# Patient Record
Sex: FEMALE | Race: WHITE | NOT HISPANIC OR LATINO | Employment: OTHER | URBAN - METROPOLITAN AREA
[De-identification: names, ages, dates, MRNs, and addresses within clinical notes are randomized per-mention and may not be internally consistent; named-entity substitution may affect disease eponyms.]

---

## 2023-02-23 LAB
ALANINE AMINOTRANSFERASE (SGPT) (U/L) IN SER/PLAS: 15 U/L (ref 7–45)
ALBUMIN (G/DL) IN SER/PLAS: 4.1 G/DL (ref 3.4–5)
ALKALINE PHOSPHATASE (U/L) IN SER/PLAS: 47 U/L (ref 33–136)
ANION GAP IN SER/PLAS: 12 MMOL/L (ref 10–20)
ASPARTATE AMINOTRANSFERASE (SGOT) (U/L) IN SER/PLAS: 19 U/L (ref 9–39)
BILIRUBIN TOTAL (MG/DL) IN SER/PLAS: 0.8 MG/DL (ref 0–1.2)
CALCIUM (MG/DL) IN SER/PLAS: 9.7 MG/DL (ref 8.6–10.3)
CARBON DIOXIDE, TOTAL (MMOL/L) IN SER/PLAS: 31 MMOL/L (ref 21–32)
CHLORIDE (MMOL/L) IN SER/PLAS: 100 MMOL/L (ref 98–107)
CHOLESTEROL IN LDL (MG/DL) IN SER/PLAS BY DIRECT ASSAY: 87 MG/DL (ref 0–129)
CREATININE (MG/DL) IN SER/PLAS: 1.28 MG/DL (ref 0.5–1.05)
ESTIMATED AVERAGE GLUCOSE FOR HBA1C: 143 MG/DL
GFR FEMALE: 40 ML/MIN/1.73M2
GLUCOSE (MG/DL) IN SER/PLAS: 121 MG/DL (ref 74–99)
HEMOGLOBIN A1C/HEMOGLOBIN TOTAL IN BLOOD: 6.6 %
POTASSIUM (MMOL/L) IN SER/PLAS: 4 MMOL/L (ref 3.5–5.3)
PROTEIN TOTAL: 7.1 G/DL (ref 6.4–8.2)
SODIUM (MMOL/L) IN SER/PLAS: 139 MMOL/L (ref 136–145)
THYROTROPIN (MIU/L) IN SER/PLAS BY DETECTION LIMIT <= 0.05 MIU/L: 1.92 MIU/L (ref 0.44–3.98)
UREA NITROGEN (MG/DL) IN SER/PLAS: 37 MG/DL (ref 6–23)

## 2023-05-09 ENCOUNTER — PATIENT OUTREACH (OUTPATIENT)
Dept: CARE COORDINATION | Facility: CLINIC | Age: 88
End: 2023-05-09

## 2023-05-09 DIAGNOSIS — S72.144A CLOSED NONDISPLACED INTERTROCHANTERIC FRACTURE OF RIGHT FEMUR, INITIAL ENCOUNTER (MULTI): ICD-10-CM

## 2023-05-09 RX ORDER — ASPIRIN 325 MG
325 TABLET ORAL 2 TIMES DAILY
COMMUNITY
End: 2023-05-22 | Stop reason: WASHOUT

## 2023-05-09 NOTE — PROGRESS NOTES
Discharge Facility:[facility]  Discharge Diagnosis:  Admission Reason: acute right hip  intertrochanteric fracture.   Final Discharge Diagnoses: acute right hip  intertrochanteric fracture. s/p surgery   Procedures: Date: 06-May-2023 07:43:00  Procedure Name: 1. IM nail right hip          Admission Date:5.5.23  Discharge Date: 5.8.23    PCP Appointment Date:5.11.23-BMP, CBC requested per hospital dc instructions. Unknown if C nurse will draw  Specialist Appointment Date: unknown  Hospital Encounter and Summary: Linked   See discharge assessment below for further details     Engagement  Call Start Time: 1047 (5/9/2023 10:47 AM)    Medications  Medications reviewed with patient/caregiver?: Yes (reviewed new meds.per patient Md told her she could take ASA and not Lovenox injections) (5/9/2023 10:47 AM)  Is the patient having any side effects they believe may be caused by any medication additions or changes?: No (5/9/2023 10:47 AM)  Does the patient have all medications ordered at discharge?: Yes (5/9/2023 10:47 AM)  Care Management Interventions: No intervention needed (5/9/2023 10:47 AM)  Is the patient taking all medications as directed (includes completed medication regime)?: Yes (5/9/2023 10:47 AM)    Appointments  Does the patient have a primary care provider?: Yes (5/9/2023 10:47 AM)  Care Management Interventions: Verified appointment date/time/provider (5/9/2023 10:47 AM)  Has the patient kept scheduled appointments due by today?: Yes (5/9/2023 10:47 AM)  Care Management Interventions: Advised patient to keep appointment (5/9/2023 10:47 AM)    Self Management  What is the home health agency?: yes, - RN, PT (5/9/2023 10:47 AM)  Has home health visited the patient within 72 hours of discharge?: Yes (5/9/2023 10:47 AM)  DME Interventions: -- (PT to evsl) (5/9/2023 10:47 AM)

## 2023-05-11 LAB
ANION GAP IN SER/PLAS: 13 MMOL/L (ref 10–20)
CALCIUM (MG/DL) IN SER/PLAS: 8.9 MG/DL (ref 8.6–10.3)
CARBON DIOXIDE, TOTAL (MMOL/L) IN SER/PLAS: 29 MMOL/L (ref 21–32)
CHLORIDE (MMOL/L) IN SER/PLAS: 100 MMOL/L (ref 98–107)
CREATININE (MG/DL) IN SER/PLAS: 0.95 MG/DL (ref 0.5–1.05)
ERYTHROCYTE DISTRIBUTION WIDTH (RATIO) BY AUTOMATED COUNT: 13.4 % (ref 11.5–14.5)
ERYTHROCYTE MEAN CORPUSCULAR HEMOGLOBIN CONCENTRATION (G/DL) BY AUTOMATED: 31.2 G/DL (ref 32–36)
ERYTHROCYTE MEAN CORPUSCULAR VOLUME (FL) BY AUTOMATED COUNT: 98 FL (ref 80–100)
ERYTHROCYTES (10*6/UL) IN BLOOD BY AUTOMATED COUNT: 2.83 X10E12/L (ref 4–5.2)
GFR FEMALE: 57 ML/MIN/1.73M2
GLUCOSE (MG/DL) IN SER/PLAS: 101 MG/DL (ref 74–99)
HEMATOCRIT (%) IN BLOOD BY AUTOMATED COUNT: 27.6 % (ref 36–46)
HEMOGLOBIN (G/DL) IN BLOOD: 8.6 G/DL (ref 12–16)
LEUKOCYTES (10*3/UL) IN BLOOD BY AUTOMATED COUNT: 12.4 X10E9/L (ref 4.4–11.3)
PLATELETS (10*3/UL) IN BLOOD AUTOMATED COUNT: 226 X10E9/L (ref 150–450)
POTASSIUM (MMOL/L) IN SER/PLAS: 3.7 MMOL/L (ref 3.5–5.3)
SODIUM (MMOL/L) IN SER/PLAS: 138 MMOL/L (ref 136–145)
UREA NITROGEN (MG/DL) IN SER/PLAS: 31 MG/DL (ref 6–23)

## 2023-05-18 PROBLEM — K22.70 BARRETT'S ESOPHAGUS: Status: ACTIVE | Noted: 2023-05-18

## 2023-05-18 PROBLEM — K21.9 GERD (GASTROESOPHAGEAL REFLUX DISEASE): Status: ACTIVE | Noted: 2023-05-18

## 2023-05-18 PROBLEM — M81.0 OSTEOPOROSIS: Status: ACTIVE | Noted: 2023-05-18

## 2023-05-18 PROBLEM — K57.90 DIVERTICULOSIS: Status: ACTIVE | Noted: 2023-05-18

## 2023-05-18 PROBLEM — N28.9 RENAL INSUFFICIENCY: Status: ACTIVE | Noted: 2023-05-18

## 2023-05-18 PROBLEM — M19.90 DJD (DEGENERATIVE JOINT DISEASE): Status: ACTIVE | Noted: 2023-05-18

## 2023-05-18 PROBLEM — I65.29 CAROTID ARTERY STENOSIS: Status: ACTIVE | Noted: 2023-05-18

## 2023-05-18 PROBLEM — E78.5 HYPERLIPEMIA: Status: ACTIVE | Noted: 2023-05-18

## 2023-05-18 PROBLEM — N20.0 KIDNEY STONE: Status: ACTIVE | Noted: 2023-05-18

## 2023-05-18 PROBLEM — I10 BENIGN ESSENTIAL HTN: Status: ACTIVE | Noted: 2023-05-18

## 2023-05-18 PROBLEM — K27.9 PEPTIC ULCER: Status: ACTIVE | Noted: 2023-05-18

## 2023-05-18 PROBLEM — M51.26 LUMBAR DISC HERNIATION: Status: ACTIVE | Noted: 2023-05-18

## 2023-05-18 PROBLEM — M54.17 LUMBOSACRAL RADICULOPATHY: Status: ACTIVE | Noted: 2023-05-18

## 2023-05-18 PROBLEM — E11.9 CONTROLLED TYPE 2 DIABETES MELLITUS WITHOUT COMPLICATION, WITHOUT LONG-TERM CURRENT USE OF INSULIN (MULTI): Status: ACTIVE | Noted: 2023-05-18

## 2023-05-18 PROBLEM — E03.9 HYPOTHYROID: Status: ACTIVE | Noted: 2023-05-18

## 2023-05-18 RX ORDER — LISINOPRIL AND HYDROCHLOROTHIAZIDE 20; 25 MG/1; MG/1
1 TABLET ORAL DAILY
COMMUNITY
End: 2023-10-09 | Stop reason: WASHOUT

## 2023-05-18 RX ORDER — ATENOLOL 50 MG/1
1 TABLET ORAL DAILY
COMMUNITY
End: 2024-01-10 | Stop reason: SDUPTHER

## 2023-05-18 RX ORDER — LEVOTHYROXINE SODIUM 75 UG/1
1 TABLET ORAL DAILY
COMMUNITY
End: 2024-01-10 | Stop reason: SDUPTHER

## 2023-05-18 RX ORDER — AMLODIPINE BESYLATE 2.5 MG/1
1 TABLET ORAL DAILY
COMMUNITY
Start: 2022-03-02 | End: 2024-01-10 | Stop reason: SDUPTHER

## 2023-05-18 RX ORDER — PANTOPRAZOLE SODIUM 40 MG/1
1 TABLET, DELAYED RELEASE ORAL DAILY
COMMUNITY
End: 2024-01-10 | Stop reason: SDUPTHER

## 2023-05-22 ENCOUNTER — OFFICE VISIT (OUTPATIENT)
Dept: PRIMARY CARE | Facility: CLINIC | Age: 88
End: 2023-05-22
Payer: MEDICARE

## 2023-05-22 VITALS
HEIGHT: 63 IN | SYSTOLIC BLOOD PRESSURE: 130 MMHG | DIASTOLIC BLOOD PRESSURE: 60 MMHG | HEART RATE: 91 BPM | BODY MASS INDEX: 27.02 KG/M2 | WEIGHT: 152.5 LBS | OXYGEN SATURATION: 98 %

## 2023-05-22 DIAGNOSIS — I10 BENIGN ESSENTIAL HTN: Primary | ICD-10-CM

## 2023-05-22 DIAGNOSIS — S72.001D CLOSED FRACTURE OF RIGHT HIP WITH ROUTINE HEALING: ICD-10-CM

## 2023-05-22 DIAGNOSIS — M80.80XS LOCALIZED OSTEOPOROSIS WITH CURRENT PATHOLOGICAL FRACTURE, SEQUELA: ICD-10-CM

## 2023-05-22 PROCEDURE — 1160F RVW MEDS BY RX/DR IN RCRD: CPT | Performed by: FAMILY MEDICINE

## 2023-05-22 PROCEDURE — 1159F MED LIST DOCD IN RCRD: CPT | Performed by: FAMILY MEDICINE

## 2023-05-22 PROCEDURE — 3078F DIAST BP <80 MM HG: CPT | Performed by: FAMILY MEDICINE

## 2023-05-22 PROCEDURE — 99213 OFFICE O/P EST LOW 20 MIN: CPT | Performed by: FAMILY MEDICINE

## 2023-05-22 PROCEDURE — 1036F TOBACCO NON-USER: CPT | Performed by: FAMILY MEDICINE

## 2023-05-22 PROCEDURE — 3075F SYST BP GE 130 - 139MM HG: CPT | Performed by: FAMILY MEDICINE

## 2023-05-22 ASSESSMENT — ENCOUNTER SYMPTOMS
LOSS OF SENSATION IN FEET: 0
PALPITATIONS: 0
DIARRHEA: 0
ARTHRALGIAS: 0
FATIGUE: 0
DEPRESSION: 0
ABDOMINAL PAIN: 0
BACK PAIN: 0
COUGH: 0
NAUSEA: 0
SHORTNESS OF BREATH: 0
CONSTIPATION: 0
CHEST TIGHTNESS: 0
VOMITING: 0
APPETITE CHANGE: 0
ACTIVITY CHANGE: 0
OCCASIONAL FEELINGS OF UNSTEADINESS: 0

## 2023-05-22 ASSESSMENT — PATIENT HEALTH QUESTIONNAIRE - PHQ9
1. LITTLE INTEREST OR PLEASURE IN DOING THINGS: NOT AT ALL
SUM OF ALL RESPONSES TO PHQ9 QUESTIONS 1 AND 2: 0
2. FEELING DOWN, DEPRESSED OR HOPELESS: NOT AT ALL

## 2023-05-22 NOTE — PROGRESS NOTES
"Patient: Savannah Yao  : 1934  PCP: Earl Sandy MD  MRN: [id number]  Program: No linked episodes     Savannah Yao is a 88 y.o. female presenting today for follow-up after being discharged from the hospital 14 days ago. The main problem requiring admission was right hip fracture. The discharge summary and/or Transitional Care Management documentation was reviewed. Medication reconciliation was performed as indicated via the \"Bubba as Reviewed\" timestamp.     Savannah Yao was contacted by Transitional Care Management services two days after her discharge. This encounter and supporting documentation was reviewed.    The complexity of medical decision making for this patient's transitional care is moderate.    Fell and fractured right hip, had ORIF done, went home after hospital, has HHC for nurse/PT, [place] [facility] for meals  Pain not bad, using APAP as needed  Coughing and hoarse    Physical Exam  Vitals and nursing note reviewed.   Constitutional:       Appearance: Normal appearance.   Cardiovascular:      Rate and Rhythm: Normal rate and regular rhythm.      Pulses: Normal pulses.      Heart sounds: Normal heart sounds.   Pulmonary:      Effort: Pulmonary effort is normal.      Breath sounds: Normal breath sounds.   Musculoskeletal:      Comments: 1-2+ pitting edema around the right ankle to the mid calf.  Normal internal/external rotation of the leg, minimal bruising at the groin.   Neurological:      Mental Status: She is alert.   Psychiatric:         Mood and Affect: Mood normal.         Behavior: Behavior normal.         Assessment/Plan       Review of Systems   Constitutional:  Negative for activity change, appetite change and fatigue.   Respiratory:  Negative for cough, chest tightness and shortness of breath.    Cardiovascular:  Negative for chest pain, palpitations and leg swelling.   Gastrointestinal:  Negative for abdominal pain, constipation, diarrhea, nausea and vomiting. "   Musculoskeletal:  Positive for gait problem. Negative for arthralgias and back pain.       Family History   Problem Relation Name Age of Onset    Cancer Mother      No Known Problems Father      Other (CVA) Sister      Hypertension Sister      Breast cancer Sister      Hypertension Brother         Engagement  Call Start Time: 1047 (5/9/2023 10:47 AM)    Medications  Medications reviewed with patient/caregiver?: Yes (reviewed new meds.per patient Md told her she could take ASA and not Lovenox injections) (5/9/2023 10:47 AM)  Is the patient having any side effects they believe may be caused by any medication additions or changes?: No (5/9/2023 10:47 AM)  Does the patient have all medications ordered at discharge?: Yes (5/9/2023 10:47 AM)  Care Management Interventions: No intervention needed (5/9/2023 10:47 AM)  Is the patient taking all medications as directed (includes completed medication regime)?: Yes (5/9/2023 10:47 AM)    Appointments  Does the patient have a primary care provider?: Yes (5/9/2023 10:47 AM)  Care Management Interventions: Verified appointment date/time/provider (5/9/2023 10:47 AM)  Has the patient kept scheduled appointments due by today?: Yes (5/9/2023 10:47 AM)  Care Management Interventions: Advised patient to keep appointment (5/9/2023 10:47 AM)    Self Management  What is the home health agency?: yes, - RN, PT (5/9/2023 10:47 AM)  Has home health visited the patient within 72 hours of discharge?: Yes (5/9/2023 10:47 AM)  DME Interventions: -- (PT to evsl) (5/9/2023 10:47 AM)        No follow-ups on file.Patient was identified as a fall risk. Risk prevention instructions provided.

## 2023-05-22 NOTE — PROGRESS NOTES
"Subjective   Patient ID: Savannah Yao is a 88 y.o. female who presents for [facility] check RT HIP Fx.    HPI     Review of Systems    Objective   /60   Pulse 91   Ht 1.6 m (5' 3\")   Wt 69.2 kg (152 lb 8 oz)   SpO2 98%   BMI 27.01 kg/m²     Physical Exam    Assessment/Plan          "

## 2023-05-22 NOTE — ASSESSMENT & PLAN NOTE
Post internal fixation of a right hip fracture in the hospital, now 14 days out from discharge, tolerating home physical therapy, has home services with nurse and [employer], did see orthopedics last week, seems to be healing well from her fall.

## 2023-05-22 NOTE — ASSESSMENT & PLAN NOTE
Patient with known existing osteoporosis, taking calcium and vitamin D, I have counseled the patient on injectable Prolia or Reclast in the past, patient has deferred treatment for her osteoporosis.

## 2023-05-22 NOTE — PATIENT INSTRUCTIONS
You can use cough drops, Mucinex/Robitussin as needed for coughing, if shortness of breath or fevers, call      Ways to Help Prevent Falls at Home    Quick Tips   ? Ask for help if you need it. Most people want to help!   ? Get up slowly after sitting or laying down   ? Wear a medical alert device or keep cell phone in your pocket   ? Use night lights, especially areas near a bathroom   ? Keep the items you use often within reach on a small stool or end table   ? Use an assistive device such as walker or cane, as directed by provider/physical therapy   ? Use a non-slip mat and grab bars in your bathroom. Look for home health sections for best options     Other Areas to Focus On   ? Exercise and nutrition: Regular exercise or taking a falls prevention class are great ways improve strength and balance. Don’t forget to stay hydrated and bring a snack!   ? Medicine side effects: Some medicines can make you sleepy or dizzy, which could cause a fall. Ask your healthcare provider about the side effects your medicines could cause. Be sure to let them know if you take any vitamins or supplements as well.   ? Tripping hazards: Remove items you could trip on, such as loose mats, rugs, cords, and clutter. Wear closed toe shoes with rubber soles.   ? Health and wellness: Get regular checkups with your healthcare provider, plus routine vision and hearing screenings. Talk with your healthcare provider about:   o Your medicines and the possible side effects - bring them in a bag if that is easier!   o Problems with balance or feeling dizzy   o Ways to promote bone health, such as Vitamin D and calcium supplements   o Questions or concerns about falling     *Ask your healthcare team if you have questions     [facility], 2022

## 2023-06-06 ENCOUNTER — PATIENT OUTREACH (OUTPATIENT)
Dept: CARE COORDINATION | Facility: CLINIC | Age: 88
End: 2023-06-06

## 2023-06-06 NOTE — PROGRESS NOTES
Call placed regarding one month post discharge follow up call.             At time of outreach call the patient feels as if their condition has            Improved since initial visit with PCP or specialist.             Questions or concerns regarding recovery period addressed at this time.               (Individualize as needed if questions arise)             Reviewed any PCP or specialists progress notes/labs/radiology reports if applicable             and addressed any questions or concerns.

## 2023-06-07 ENCOUNTER — TELEPHONE (OUTPATIENT)
Dept: PRIMARY CARE | Facility: CLINIC | Age: 88
End: 2023-06-07

## 2023-06-07 NOTE — TELEPHONE ENCOUNTER
Pt states she is currently taking 2 low dose aspirins daily, on the 6/12 she is to stop one of them. Would you like her to resume her BP med that you stopped once she is back to one aspirin daily?

## 2023-07-05 ENCOUNTER — TELEPHONE (OUTPATIENT)
Dept: PRIMARY CARE | Facility: CLINIC | Age: 88
End: 2023-07-05

## 2023-07-05 NOTE — TELEPHONE ENCOUNTER
Pt states she loses voice every year and takes OTC Meds usually but Pt states OTC MEDS are not working and is asking for something. Please advise

## 2023-08-15 LAB
ALANINE AMINOTRANSFERASE (SGPT) (U/L) IN SER/PLAS: 11 U/L (ref 7–45)
ALBUMIN (G/DL) IN SER/PLAS: 3.8 G/DL (ref 3.4–5)
ALKALINE PHOSPHATASE (U/L) IN SER/PLAS: 51 U/L (ref 33–136)
ANION GAP IN SER/PLAS: 9 MMOL/L (ref 10–20)
ASPARTATE AMINOTRANSFERASE (SGOT) (U/L) IN SER/PLAS: 18 U/L (ref 9–39)
BILIRUBIN TOTAL (MG/DL) IN SER/PLAS: 0.8 MG/DL (ref 0–1.2)
CALCIUM (MG/DL) IN SER/PLAS: 9.5 MG/DL (ref 8.6–10.3)
CARBON DIOXIDE, TOTAL (MMOL/L) IN SER/PLAS: 30 MMOL/L (ref 21–32)
CHLORIDE (MMOL/L) IN SER/PLAS: 104 MMOL/L (ref 98–107)
CHOLESTEROL (MG/DL) IN SER/PLAS: 153 MG/DL (ref 0–199)
CHOLESTEROL IN HDL (MG/DL) IN SER/PLAS: 57 MG/DL
CHOLESTEROL/HDL RATIO: 2.7
CREATININE (MG/DL) IN SER/PLAS: 1.02 MG/DL (ref 0.5–1.05)
ESTIMATED AVERAGE GLUCOSE FOR HBA1C: 143 MG/DL
GFR FEMALE: 53 ML/MIN/1.73M2
GLUCOSE (MG/DL) IN SER/PLAS: 92 MG/DL (ref 74–99)
HEMOGLOBIN A1C/HEMOGLOBIN TOTAL IN BLOOD: 6.6 %
LDL: 73 MG/DL (ref 0–99)
POTASSIUM (MMOL/L) IN SER/PLAS: 4.2 MMOL/L (ref 3.5–5.3)
PROTEIN TOTAL: 6.5 G/DL (ref 6.4–8.2)
SODIUM (MMOL/L) IN SER/PLAS: 139 MMOL/L (ref 136–145)
TRIGLYCERIDE (MG/DL) IN SER/PLAS: 114 MG/DL (ref 0–149)
UREA NITROGEN (MG/DL) IN SER/PLAS: 22 MG/DL (ref 6–23)
VLDL: 23 MG/DL (ref 0–40)

## 2023-08-30 LAB
ALBUMIN (MG/L) IN URINE: 9.5 MG/L
ALBUMIN/CREATININE (UG/MG) IN URINE: 15.5 UG/MG CRT (ref 0–30)
CREATININE (MG/DL) IN URINE: 61.4 MG/DL (ref 20–320)

## 2023-09-05 ENCOUNTER — APPOINTMENT (OUTPATIENT)
Dept: PRIMARY CARE | Facility: CLINIC | Age: 88
End: 2023-09-05

## 2023-09-06 ASSESSMENT — ENCOUNTER SYMPTOMS
MYALGIAS: 0
FEVER: 0
FATIGUE: 0
COUGH: 0
HEADACHES: 0
CHILLS: 0
SHORTNESS OF BREATH: 0

## 2023-09-06 NOTE — PROGRESS NOTES
"Subjective   Patient ID: Savannah Yao is a 89 y.o. female who presents for Follow-up (Covid positive on 9/2 , denies having any symptoms other than cough ).    Savannah comes to the office, with her  and daughter, today as a follow-up from the emergency room visit.  Was seen at [facility] on 9/2/2023 and those records/diagnostics were reviewed today.  Was treated with albuterol/Bromfed cough syrup.  Chest x-ray showed clear lungs with no pleural effusions. No Paxlovid. Cough syrup continues to use 1/2 tsp every 4 hours. Cough nonprod. Appetite: good.  Every 6 Hours for use of DEJON. No SOB/dyspnea/wheezing.           Review of Systems   Constitutional:  Negative for chills, fatigue and fever.   Respiratory:  Negative for cough, shortness of breath and wheezing.    Gastrointestinal:  Negative for diarrhea, nausea and vomiting.   Musculoskeletal:  Negative for myalgias.   Neurological:  Negative for headaches.       Objective   /72   Pulse 79   Temp 37.4 °C (99.3 °F) (Oral)   Ht 1.6 m (5' 3\")   Wt 67.6 kg (149 lb 1.6 oz)   SpO2 93%   BMI 26.41 kg/m²     Physical Exam  Vitals and nursing note reviewed.   Constitutional:       Appearance: Normal appearance.   HENT:      Head: Normocephalic.   Cardiovascular:      Rate and Rhythm: Normal rate and regular rhythm.      Heart sounds: Normal heart sounds.   Pulmonary:      Effort: Pulmonary effort is normal.      Breath sounds: Examination of the right-lower field reveals decreased breath sounds. Examination of the left-lower field reveals decreased breath sounds. Decreased breath sounds present.   Musculoskeletal:      Cervical back: Normal range of motion.   Skin:     General: Skin is warm and dry.   Neurological:      General: No focal deficit present.      Mental Status: She is alert and oriented to person, place, and time.   Psychiatric:         Mood and Affect: Mood normal.         Thought Content: Thought content normal.       Assessment/Plan "   Problem List Items Addressed This Visit       COVID - Primary     1. COVID      Continue albuterol as needed and cough syrup               Patient was identified as a fall risk. Risk prevention instructions provided.

## 2023-09-07 ENCOUNTER — OFFICE VISIT (OUTPATIENT)
Dept: PRIMARY CARE | Facility: CLINIC | Age: 88
End: 2023-09-07
Payer: MEDICARE

## 2023-09-07 VITALS
DIASTOLIC BLOOD PRESSURE: 72 MMHG | SYSTOLIC BLOOD PRESSURE: 124 MMHG | OXYGEN SATURATION: 93 % | WEIGHT: 149.1 LBS | BODY MASS INDEX: 26.42 KG/M2 | HEIGHT: 63 IN | TEMPERATURE: 99.3 F | HEART RATE: 79 BPM

## 2023-09-07 DIAGNOSIS — U07.1 COVID: Primary | ICD-10-CM

## 2023-09-07 PROCEDURE — 99213 OFFICE O/P EST LOW 20 MIN: CPT | Performed by: NURSE PRACTITIONER

## 2023-09-07 PROCEDURE — 3078F DIAST BP <80 MM HG: CPT | Performed by: NURSE PRACTITIONER

## 2023-09-07 PROCEDURE — 3074F SYST BP LT 130 MM HG: CPT | Performed by: NURSE PRACTITIONER

## 2023-09-07 PROCEDURE — 1159F MED LIST DOCD IN RCRD: CPT | Performed by: NURSE PRACTITIONER

## 2023-09-07 PROCEDURE — 1036F TOBACCO NON-USER: CPT | Performed by: NURSE PRACTITIONER

## 2023-09-07 PROCEDURE — 1160F RVW MEDS BY RX/DR IN RCRD: CPT | Performed by: NURSE PRACTITIONER

## 2023-09-07 ASSESSMENT — ENCOUNTER SYMPTOMS
WHEEZING: 0
VOMITING: 0
DIARRHEA: 0
NAUSEA: 0

## 2023-09-07 NOTE — PATIENT INSTRUCTIONS
Continue albuterol as needed and cough syrup.  Call office if develops worsening cough/fever/chilling/no appetite.      Ways to Help Prevent Falls at Home    Quick Tips   ? Ask for help if you need it. Most people want to help!   ? Get up slowly after sitting or laying down   ? Wear a medical alert device or keep cell phone in your pocket   ? Use night lights, especially areas near a bathroom   ? Keep the items you use often within reach on a small stool or end table   ? Use an assistive device such as walker or cane, as directed by provider/physical therapy   ? Use a non-slip mat and grab bars in your bathroom. Look for home health sections for best options     Other Areas to Focus On   ? Exercise and nutrition: Regular exercise or taking a falls prevention class are great ways improve strength and balance. Don’t forget to stay hydrated and bring a snack!   ? Medicine side effects: Some medicines can make you sleepy or dizzy, which could cause a fall. Ask your healthcare provider about the side effects your medicines could cause. Be sure to let them know if you take any vitamins or supplements as well.   ? Tripping hazards: Remove items you could trip on, such as loose mats, rugs, cords, and clutter. Wear closed toe shoes with rubber soles.   ? Health and wellness: Get regular checkups with your healthcare provider, plus routine vision and hearing screenings. Talk with your healthcare provider about:   o Your medicines and the possible side effects - bring them in a bag if that is easier!   o Problems with balance or feeling dizzy   o Ways to promote bone health, such as Vitamin D and calcium supplements   o Questions or concerns about falling     *Ask your healthcare team if you have questions     [facility], 2022

## 2023-09-21 ENCOUNTER — PATIENT OUTREACH (OUTPATIENT)
Dept: CARE COORDINATION | Facility: CLINIC | Age: 88
End: 2023-09-21

## 2023-09-21 NOTE — PROGRESS NOTES
Patient has met target of no readmission for 90 days post hospital discharge and is graduated from Transitional Care Management program at this time

## 2023-10-06 ENCOUNTER — TELEPHONE (OUTPATIENT)
Dept: OTOLARYNGOLOGY | Facility: HOSPITAL | Age: 88
End: 2023-10-06

## 2023-10-09 ENCOUNTER — OFFICE VISIT (OUTPATIENT)
Dept: PRIMARY CARE | Facility: CLINIC | Age: 88
End: 2023-10-09
Payer: MEDICARE

## 2023-10-09 VITALS
HEART RATE: 73 BPM | OXYGEN SATURATION: 97 % | WEIGHT: 152.3 LBS | SYSTOLIC BLOOD PRESSURE: 130 MMHG | DIASTOLIC BLOOD PRESSURE: 70 MMHG | BODY MASS INDEX: 26.98 KG/M2 | HEIGHT: 63 IN

## 2023-10-09 DIAGNOSIS — E11.9 CONTROLLED TYPE 2 DIABETES MELLITUS WITHOUT COMPLICATION, WITHOUT LONG-TERM CURRENT USE OF INSULIN (MULTI): Primary | ICD-10-CM

## 2023-10-09 DIAGNOSIS — E03.9 ACQUIRED HYPOTHYROIDISM: ICD-10-CM

## 2023-10-09 DIAGNOSIS — M80.80XS LOCALIZED OSTEOPOROSIS WITH CURRENT PATHOLOGICAL FRACTURE, SEQUELA: ICD-10-CM

## 2023-10-09 DIAGNOSIS — I10 BENIGN ESSENTIAL HTN: ICD-10-CM

## 2023-10-09 DIAGNOSIS — E78.2 MIXED HYPERLIPIDEMIA: ICD-10-CM

## 2023-10-09 DIAGNOSIS — N28.9 RENAL INSUFFICIENCY: ICD-10-CM

## 2023-10-09 DIAGNOSIS — K21.9 GASTROESOPHAGEAL REFLUX DISEASE WITHOUT ESOPHAGITIS: ICD-10-CM

## 2023-10-09 PROBLEM — U07.1 COVID: Status: RESOLVED | Noted: 2023-09-07 | Resolved: 2023-10-09

## 2023-10-09 PROCEDURE — 1036F TOBACCO NON-USER: CPT | Performed by: FAMILY MEDICINE

## 2023-10-09 PROCEDURE — 3075F SYST BP GE 130 - 139MM HG: CPT | Performed by: FAMILY MEDICINE

## 2023-10-09 PROCEDURE — 1160F RVW MEDS BY RX/DR IN RCRD: CPT | Performed by: FAMILY MEDICINE

## 2023-10-09 PROCEDURE — 99213 OFFICE O/P EST LOW 20 MIN: CPT | Performed by: FAMILY MEDICINE

## 2023-10-09 PROCEDURE — 3078F DIAST BP <80 MM HG: CPT | Performed by: FAMILY MEDICINE

## 2023-10-09 PROCEDURE — 1159F MED LIST DOCD IN RCRD: CPT | Performed by: FAMILY MEDICINE

## 2023-10-09 ASSESSMENT — ENCOUNTER SYMPTOMS
LIGHT-HEADEDNESS: 0
UNEXPECTED WEIGHT CHANGE: 0
ADENOPATHY: 0
SHORTNESS OF BREATH: 0
VOICE CHANGE: 1
APPETITE CHANGE: 0
CONSTIPATION: 0
PALPITATIONS: 0
DIFFICULTY URINATING: 0
WHEEZING: 0
ABDOMINAL DISTENTION: 0
TROUBLE SWALLOWING: 0
HEADACHES: 0
DIARRHEA: 0
NAUSEA: 0
VOMITING: 0
ABDOMINAL PAIN: 0
FATIGUE: 0
DIZZINESS: 0
NUMBNESS: 0
RHINORRHEA: 0
COUGH: 0
ACTIVITY CHANGE: 0
ARTHRALGIAS: 0

## 2023-10-09 NOTE — ASSESSMENT & PLAN NOTE
Blood pressure under good control renal function and August it actually improved, continue with current medications recheck in 3 months.

## 2023-10-09 NOTE — ASSESSMENT & PLAN NOTE
Patient continues with calcium and vitamin D, advised about options for treating osteoporosis given her hip fracture, patient defers Reclast or Prolia despite .

## 2023-10-09 NOTE — PROGRESS NOTES
Subjective   Patient ID: Savannah Yao is a 89 y.o. female who presents for 6 MO LABS.    HPI   No headache, chest pain, shortness of breath, dizziness, lightheadedness, or edema  No low blood sugars since last OV, seen opthalmology in the past year, and no numbness or tingling in feet, skin normal.  The patient is taking thyroid medications as directed without problems, labs done in the last year, no symptoms of excessive fatigue, edema or weight gain.  Taking PPI daily without breakthrough symptoms.  Reviewed dietary, caffeine, tobacco, alcohol, and NSAID use.  No dyspepsia, dysphagia, reflux, melena, or abdominal pain.  Hip pain doing OK, to have surgery on vocal cords for hoarseness, no cough or dysphagia  Using a walker, no falls    Review of Systems   Constitutional:  Negative for activity change, appetite change, fatigue and unexpected weight change.   HENT:  Positive for voice change. Negative for ear pain, nosebleeds, rhinorrhea, sneezing and trouble swallowing.    Respiratory:  Negative for cough, shortness of breath and wheezing.    Cardiovascular:  Negative for chest pain, palpitations and leg swelling.   Gastrointestinal:  Negative for abdominal distention, abdominal pain, constipation, diarrhea, nausea and vomiting.   Genitourinary:  Negative for difficulty urinating.   Musculoskeletal:  Positive for gait problem. Negative for arthralgias.   Skin:  Negative for rash.   Neurological:  Negative for dizziness, light-headedness, numbness and headaches.   Hematological:  Negative for adenopathy.   Psychiatric/Behavioral:  Negative for behavioral problems.    All other systems reviewed and are negative.      Current Outpatient Medications:     amLODIPine (Norvasc) 2.5 mg tablet, Take 1 tablet (2.5 mg) by mouth once daily., Disp: , Rfl:     aspirin-calcium carbonate 81 mg-300 mg calcium(777 mg) tablet, Take 1 tablet by mouth once daily., Disp: , Rfl:     atenolol (Tenormin) 50 mg tablet, Take 1 tablet  "(50 mg) by mouth once daily., Disp: , Rfl:     CALCIUM CITRATE-VITAMIN D3 ORAL, Take 1 tablet by mouth once daily., Disp: , Rfl:     levothyroxine (Synthroid, Levoxyl) 75 mcg tablet, Take 1 tablet (75 mcg) by mouth once daily., Disp: , Rfl:     pantoprazole (ProtoNix) 40 mg EC tablet, Take 1 tablet (40 mg) by mouth once daily., Disp: , Rfl:       Objective   /70   Pulse 73   Ht 1.6 m (5' 3\")   Wt 69.1 kg (152 lb 4.8 oz)   SpO2 97%   BMI 26.98 kg/m²     Physical Exam  Vitals and nursing note reviewed.   Constitutional:       Appearance: Normal appearance.   HENT:      Head: Normocephalic and atraumatic.      Right Ear: Tympanic membrane, ear canal and external ear normal.      Left Ear: Tympanic membrane, ear canal and external ear normal.      Nose: Nose normal.      Mouth/Throat:      Mouth: Mucous membranes are moist.      Pharynx: Oropharynx is clear.   Cardiovascular:      Rate and Rhythm: Normal rate and regular rhythm.      Pulses: Normal pulses.      Heart sounds: Normal heart sounds.   Pulmonary:      Effort: Pulmonary effort is normal.      Breath sounds: Normal breath sounds.   Musculoskeletal:      Cervical back: Normal range of motion and neck supple.   Neurological:      Mental Status: She is alert.   Psychiatric:         Mood and Affect: Mood normal.         Behavior: Behavior normal.         Assessment/Plan   Problem List Items Addressed This Visit             ICD-10-CM    Benign essential HTN I10     Blood pressure under good control renal function and August it actually improved, continue with current medications recheck in 3 months.         Relevant Orders    Follow Up In Primary Care - Established    Comprehensive Metabolic Panel    Controlled type 2 diabetes mellitus without complication, without long-term current use of insulin (CMS/[facility]) - Primary E11.9     A1c testing in August was below 7, continue with current medication and diet no change.         Relevant Orders    Follow Up In " Primary Care - Established    Cholesterol, LDL Direct    Comprehensive Metabolic Panel    Hemoglobin A1C    GERD (gastroesophageal reflux disease) K21.9     Stable with medication, no issues with dysphagia.         Relevant Orders    Follow Up In Primary Care - Established    Hyperlipemia E78.5     Continue with current medications no change.         Relevant Orders    Follow Up In Primary Care - Established    Cholesterol, LDL Direct    Comprehensive Metabolic Panel    Hypothyroid E03.9     Last TSH was therapeutic, no change.         Relevant Orders    Follow Up In Primary Care - Established    Osteoporosis M81.0     Patient continues with calcium and vitamin D, advised about options for treating osteoporosis given her hip fracture, patient defers Reclast or Prolia despite .         Relevant Orders    Follow Up In Primary Care - Established    Renal insufficiency N28.9     Blood testing in August appear to be stable, as she creatinine improved with changing ACE inhibitor to amlodipine.         Relevant Orders    Follow Up In Primary Care - Established    Comprehensive Metabolic Panel    CBC and Auto Differential

## 2023-10-09 NOTE — ASSESSMENT & PLAN NOTE
Blood testing in August appear to be stable, as she creatinine improved with changing ACE inhibitor to amlodipine.

## 2023-10-16 DIAGNOSIS — R49.0 HOARSENESS OF VOICE: ICD-10-CM

## 2023-10-30 ENCOUNTER — PREP FOR PROCEDURE (OUTPATIENT)
Dept: OTOLARYNGOLOGY | Facility: CLINIC | Age: 88
End: 2023-10-30

## 2023-10-30 PROBLEM — R49.0 HOARSENESS OF VOICE: Status: ACTIVE | Noted: 2023-10-16

## 2023-11-16 ENCOUNTER — HOSPITAL ENCOUNTER (OUTPATIENT)
Facility: HOSPITAL | Age: 88
Setting detail: OUTPATIENT SURGERY
Discharge: HOME | End: 2023-11-16
Attending: OTOLARYNGOLOGY | Admitting: OTOLARYNGOLOGY
Payer: MEDICARE

## 2023-11-16 ENCOUNTER — ANESTHESIA (OUTPATIENT)
Dept: OPERATING ROOM | Facility: HOSPITAL | Age: 88
End: 2023-11-16
Payer: MEDICARE

## 2023-11-16 ENCOUNTER — ANESTHESIA EVENT (OUTPATIENT)
Dept: OPERATING ROOM | Facility: HOSPITAL | Age: 88
End: 2023-11-16
Payer: MEDICARE

## 2023-11-16 VITALS
TEMPERATURE: 98.1 F | WEIGHT: 151.01 LBS | OXYGEN SATURATION: 96 % | BODY MASS INDEX: 26.76 KG/M2 | SYSTOLIC BLOOD PRESSURE: 154 MMHG | DIASTOLIC BLOOD PRESSURE: 65 MMHG | RESPIRATION RATE: 14 BRPM | HEIGHT: 63 IN | HEART RATE: 78 BPM

## 2023-11-16 DIAGNOSIS — R49.0 HOARSENESS OF VOICE: Primary | ICD-10-CM

## 2023-11-16 PROCEDURE — 3700000002 HC GENERAL ANESTHESIA TIME - EACH INCREMENTAL 1 MINUTE: Performed by: OTOLARYNGOLOGY

## 2023-11-16 PROCEDURE — A4217 STERILE WATER/SALINE, 500 ML: HCPCS | Performed by: OTOLARYNGOLOGY

## 2023-11-16 PROCEDURE — 2500000004 HC RX 250 GENERAL PHARMACY W/ HCPCS (ALT 636 FOR OP/ED): Performed by: OTOLARYNGOLOGY

## 2023-11-16 PROCEDURE — 2500000004 HC RX 250 GENERAL PHARMACY W/ HCPCS (ALT 636 FOR OP/ED): Performed by: ANESTHESIOLOGIST ASSISTANT

## 2023-11-16 PROCEDURE — 2720000007 HC OR 272 NO HCPCS: Performed by: OTOLARYNGOLOGY

## 2023-11-16 PROCEDURE — 7100000010 HC PHASE TWO TIME - EACH INCREMENTAL 1 MINUTE: Performed by: OTOLARYNGOLOGY

## 2023-11-16 PROCEDURE — 99100 ANES PT EXTEME AGE<1 YR&>70: CPT | Performed by: STUDENT IN AN ORGANIZED HEALTH CARE EDUCATION/TRAINING PROGRAM

## 2023-11-16 PROCEDURE — 2500000001 HC RX 250 WO HCPCS SELF ADMINISTERED DRUGS (ALT 637 FOR MEDICARE OP): Performed by: OTOLARYNGOLOGY

## 2023-11-16 PROCEDURE — A31591 LARYNGOPLASTY, MEDIALIZATION, UNILATERAL: Performed by: ANESTHESIOLOGIST ASSISTANT

## 2023-11-16 PROCEDURE — 2500000004 HC RX 250 GENERAL PHARMACY W/ HCPCS (ALT 636 FOR OP/ED): Performed by: STUDENT IN AN ORGANIZED HEALTH CARE EDUCATION/TRAINING PROGRAM

## 2023-11-16 PROCEDURE — 3600000003 HC OR TIME - INITIAL BASE CHARGE - PROCEDURE LEVEL THREE: Performed by: OTOLARYNGOLOGY

## 2023-11-16 PROCEDURE — 2500000005 HC RX 250 GENERAL PHARMACY W/O HCPCS: Performed by: OTOLARYNGOLOGY

## 2023-11-16 PROCEDURE — 3600000008 HC OR TIME - EACH INCREMENTAL 1 MINUTE - PROCEDURE LEVEL THREE: Performed by: OTOLARYNGOLOGY

## 2023-11-16 PROCEDURE — A31591 LARYNGOPLASTY, MEDIALIZATION, UNILATERAL: Performed by: STUDENT IN AN ORGANIZED HEALTH CARE EDUCATION/TRAINING PROGRAM

## 2023-11-16 PROCEDURE — 31591 LARYNGOPLASTY MEDIALIZATION: CPT | Performed by: OTOLARYNGOLOGY

## 2023-11-16 PROCEDURE — 7100000009 HC PHASE TWO TIME - INITIAL BASE CHARGE: Performed by: OTOLARYNGOLOGY

## 2023-11-16 PROCEDURE — 31575 DIAGNOSTIC LARYNGOSCOPY: CPT | Performed by: OTOLARYNGOLOGY

## 2023-11-16 PROCEDURE — 2780000003 HC OR 278 NO HCPCS: Performed by: OTOLARYNGOLOGY

## 2023-11-16 PROCEDURE — 3700000001 HC GENERAL ANESTHESIA TIME - INITIAL BASE CHARGE: Performed by: OTOLARYNGOLOGY

## 2023-11-16 DEVICE — IMPLANTABLE DEVICE: Type: IMPLANTABLE DEVICE | Site: NECK | Status: FUNCTIONAL

## 2023-11-16 RX ORDER — ONDANSETRON HYDROCHLORIDE 2 MG/ML
4 INJECTION, SOLUTION INTRAVENOUS ONCE AS NEEDED
Status: DISCONTINUED | OUTPATIENT
Start: 2023-11-16 | End: 2023-11-16 | Stop reason: HOSPADM

## 2023-11-16 RX ORDER — OXYCODONE HYDROCHLORIDE 5 MG/1
10 TABLET ORAL EVERY 4 HOURS PRN
Status: DISCONTINUED | OUTPATIENT
Start: 2023-11-16 | End: 2023-11-16 | Stop reason: HOSPADM

## 2023-11-16 RX ORDER — SODIUM CHLORIDE 0.9 G/100ML
IRRIGANT IRRIGATION AS NEEDED
Status: DISCONTINUED | OUTPATIENT
Start: 2023-11-16 | End: 2023-11-16 | Stop reason: HOSPADM

## 2023-11-16 RX ORDER — FENTANYL CITRATE 50 UG/ML
INJECTION, SOLUTION INTRAMUSCULAR; INTRAVENOUS AS NEEDED
Status: DISCONTINUED | OUTPATIENT
Start: 2023-11-16 | End: 2023-11-16

## 2023-11-16 RX ORDER — ACETAMINOPHEN 325 MG/1
650 TABLET ORAL EVERY 4 HOURS PRN
Status: DISCONTINUED | OUTPATIENT
Start: 2023-11-16 | End: 2023-11-16 | Stop reason: HOSPADM

## 2023-11-16 RX ORDER — ACETAMINOPHEN 160 MG/5ML
650 LIQUID ORAL EVERY 6 HOURS
Qty: 120 ML | Refills: 0 | Status: SHIPPED | OUTPATIENT
Start: 2023-11-16 | End: 2023-11-23

## 2023-11-16 RX ORDER — SODIUM CHLORIDE, SODIUM LACTATE, POTASSIUM CHLORIDE, CALCIUM CHLORIDE 600; 310; 30; 20 MG/100ML; MG/100ML; MG/100ML; MG/100ML
100 INJECTION, SOLUTION INTRAVENOUS CONTINUOUS
Status: DISCONTINUED | OUTPATIENT
Start: 2023-11-16 | End: 2023-11-16 | Stop reason: HOSPADM

## 2023-11-16 RX ORDER — CEFAZOLIN 1 G/1
INJECTION, POWDER, FOR SOLUTION INTRAVENOUS AS NEEDED
Status: DISCONTINUED | OUTPATIENT
Start: 2023-11-16 | End: 2023-11-16

## 2023-11-16 RX ORDER — ALBUTEROL SULFATE 0.83 MG/ML
2.5 SOLUTION RESPIRATORY (INHALATION) ONCE AS NEEDED
Status: DISCONTINUED | OUTPATIENT
Start: 2023-11-16 | End: 2023-11-16 | Stop reason: HOSPADM

## 2023-11-16 RX ORDER — LIDOCAINE HYDROCHLORIDE AND EPINEPHRINE 10; 10 MG/ML; UG/ML
INJECTION, SOLUTION INFILTRATION; PERINEURAL AS NEEDED
Status: DISCONTINUED | OUTPATIENT
Start: 2023-11-16 | End: 2023-11-16 | Stop reason: HOSPADM

## 2023-11-16 RX ORDER — OXYMETAZOLINE HCL 0.05 %
SPRAY, NON-AEROSOL (ML) NASAL AS NEEDED
Status: DISCONTINUED | OUTPATIENT
Start: 2023-11-16 | End: 2023-11-16 | Stop reason: HOSPADM

## 2023-11-16 RX ORDER — OXYCODONE HYDROCHLORIDE 5 MG/1
5 TABLET ORAL EVERY 4 HOURS PRN
Status: DISCONTINUED | OUTPATIENT
Start: 2023-11-16 | End: 2023-11-16 | Stop reason: HOSPADM

## 2023-11-16 RX ORDER — LIDOCAINE HYDROCHLORIDE 40 MG/ML
SOLUTION TOPICAL AS NEEDED
Status: DISCONTINUED | OUTPATIENT
Start: 2023-11-16 | End: 2023-11-16 | Stop reason: HOSPADM

## 2023-11-16 RX ORDER — ONDANSETRON HYDROCHLORIDE 2 MG/ML
INJECTION, SOLUTION INTRAVENOUS AS NEEDED
Status: DISCONTINUED | OUTPATIENT
Start: 2023-11-16 | End: 2023-11-16

## 2023-11-16 RX ORDER — DEXMEDETOMIDINE HYDROCHLORIDE 4 UG/ML
INJECTION, SOLUTION INTRAVENOUS CONTINUOUS PRN
Status: DISCONTINUED | OUTPATIENT
Start: 2023-11-16 | End: 2023-11-16

## 2023-11-16 RX ORDER — ACETAMINOPHEN 10 MG/ML
1000 INJECTION, SOLUTION INTRAVENOUS ONCE
Status: COMPLETED | OUTPATIENT
Start: 2023-11-16 | End: 2023-11-16

## 2023-11-16 RX ORDER — DEXAMETHASONE SODIUM PHOSPHATE 100 MG/10ML
INJECTION INTRAMUSCULAR; INTRAVENOUS AS NEEDED
Status: DISCONTINUED | OUTPATIENT
Start: 2023-11-16 | End: 2023-11-16

## 2023-11-16 RX ORDER — HYDROMORPHONE HYDROCHLORIDE 1 MG/ML
0.5 INJECTION, SOLUTION INTRAMUSCULAR; INTRAVENOUS; SUBCUTANEOUS EVERY 5 MIN PRN
Status: DISCONTINUED | OUTPATIENT
Start: 2023-11-16 | End: 2023-11-16 | Stop reason: HOSPADM

## 2023-11-16 RX ORDER — HYDROMORPHONE HYDROCHLORIDE 1 MG/ML
0.2 INJECTION, SOLUTION INTRAMUSCULAR; INTRAVENOUS; SUBCUTANEOUS EVERY 5 MIN PRN
Status: DISCONTINUED | OUTPATIENT
Start: 2023-11-16 | End: 2023-11-16 | Stop reason: HOSPADM

## 2023-11-16 RX ORDER — CEPHALEXIN 500 MG/1
500 CAPSULE ORAL 2 TIMES DAILY
Qty: 14 CAPSULE | Refills: 0 | Status: SHIPPED | OUTPATIENT
Start: 2023-11-16 | End: 2023-11-23

## 2023-11-16 RX ADMIN — CEFAZOLIN 2 G: 1 INJECTION, POWDER, FOR SOLUTION INTRAMUSCULAR; INTRAVENOUS at 07:40

## 2023-11-16 RX ADMIN — ONDANSETRON 4 MG: 2 INJECTION, SOLUTION INTRAMUSCULAR; INTRAVENOUS at 07:49

## 2023-11-16 RX ADMIN — ACETAMINOPHEN 1000 MG: 10 INJECTION INTRAVENOUS at 09:57

## 2023-11-16 RX ADMIN — DEXMEDETOMIDINE HYDROCHLORIDE 8 MCG: 100 INJECTION, SOLUTION INTRAVENOUS at 07:35

## 2023-11-16 RX ADMIN — FENTANYL CITRATE 12.5 MCG: 50 INJECTION, SOLUTION INTRAMUSCULAR; INTRAVENOUS at 08:59

## 2023-11-16 RX ADMIN — DEXAMETHASONE SODIUM PHOSPHATE 10 MG: 10 INJECTION INTRAMUSCULAR; INTRAVENOUS at 07:47

## 2023-11-16 RX ADMIN — SODIUM CHLORIDE, SODIUM LACTATE, POTASSIUM CHLORIDE, AND CALCIUM CHLORIDE: 600; 310; 30; 20 INJECTION, SOLUTION INTRAVENOUS at 07:24

## 2023-11-16 RX ADMIN — DEXMEDETOMIDINE HYDROCHLORIDE 0.5 MCG/KG/HR: 4 INJECTION, SOLUTION INTRAVENOUS at 07:35

## 2023-11-16 RX ADMIN — DEXMEDETOMIDINE HYDROCHLORIDE 4 MCG: 100 INJECTION, SOLUTION INTRAVENOUS at 07:32

## 2023-11-16 RX ADMIN — FENTANYL CITRATE 25 MCG: 50 INJECTION, SOLUTION INTRAMUSCULAR; INTRAVENOUS at 07:43

## 2023-11-16 RX ADMIN — DEXMEDETOMIDINE HYDROCHLORIDE 0.3 MCG/KG/HR: 4 INJECTION, SOLUTION INTRAVENOUS at 07:32

## 2023-11-16 RX ADMIN — FENTANYL CITRATE 12.5 MCG: 50 INJECTION, SOLUTION INTRAMUSCULAR; INTRAVENOUS at 08:42

## 2023-11-16 ASSESSMENT — PAIN SCALES - GENERAL
PAIN_LEVEL: 0
PAINLEVEL_OUTOF10: 3
PAINLEVEL_OUTOF10: 0 - NO PAIN
PAINLEVEL_OUTOF10: 3

## 2023-11-16 ASSESSMENT — COLUMBIA-SUICIDE SEVERITY RATING SCALE - C-SSRS
1. IN THE PAST MONTH, HAVE YOU WISHED YOU WERE DEAD OR WISHED YOU COULD GO TO SLEEP AND NOT WAKE UP?: NO
6. HAVE YOU EVER DONE ANYTHING, STARTED TO DO ANYTHING, OR PREPARED TO DO ANYTHING TO END YOUR LIFE?: NO
2. HAVE YOU ACTUALLY HAD ANY THOUGHTS OF KILLING YOURSELF?: NO

## 2023-11-16 ASSESSMENT — PAIN - FUNCTIONAL ASSESSMENT
PAIN_FUNCTIONAL_ASSESSMENT: 0-10

## 2023-11-16 NOTE — H&P
History Of Present Illness  Savannah Yao is a 89 y.o. 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      Exacerbating factors include: intubation injury.       PMH: HTN, DM 2 that is well controlled, DJD, GERD, R CEA 2020 w/o voice changes, Khan's esophagus, denies bleeding history, cardiac or pulmonary history.     Past Medical History  She has a past medical history of Personal history of other diseases of the musculoskeletal system and connective tissue (06/21/2021).    Surgical History  She has a past surgical history that includes Other surgical history (11/12/2019); Other surgical history (11/12/2019); Other surgical history (11/12/2019); Other surgical history (09/02/2021); Other surgical history (05/28/2021); Other surgical history (01/29/2021); and Hip fracture surgery (Right, 05/05/2023).     Social History  She reports that she has never smoked. She has never used smokeless tobacco. Drug use questions deferred to the physician. She reports that she does not drink alcohol.    Family History  Family History   Problem Relation Name Age of Onset    Cancer Mother      No Known Problems Father      Other (CVA) Sister      Hypertension Sister      Breast cancer Sister      Hypertension Brother          Allergies  Aspirin, Diclofenac sodium, and Tramadol    Review of Systems     Physical Exam  Constitutional:       General: She is not in acute distress.  Cardiovascular:      Rate and Rhythm: Normal rate.   Pulmonary:      Effort: No respiratory distress.          Last Recorded Vitals  There were no vitals taken for this visit.    Relevant Results        Scheduled medications    Continuous medications    PRN medications      \     Assessment/Plan   Principal Problem:    Hoarseness of voice      OR for left medialization thyroplasty           Gisell Hutton MD

## 2023-11-16 NOTE — OP NOTE
Left Thyroplasty and Flexible Laryngoscopy (L), Laryngoscopy Operative Note     Date: 2023  OR Location: [facility] OR    Name: Savannah Yao : 1934, Age: 89 y.o., MRN: [id number], Sex: female    Diagnosis  Pre-op Diagnosis     * Hoarseness of voice [R49.0] Post-op Diagnosis     * Hoarseness of voice [R49.0]     Procedures  Left Thyroplasty and Flexible Laryngoscopy  99614 - ID LARYNGOPLASTY MEDIALIZATION UNLIATERAL    Laryngoscopy  87785 - ID LARYNGOSCOPY FLEXIBLE DIAGNOSTIC    Surgeons      * Deonna King - Primary    Resident/Fellow/Other Assistant:  Surgeon(s) and Role:     * Gisell Hutton MD - Resident - Assisting    Procedure Summary  Anesthesia: Moderate Sedation  ASA: III  Anesthesia Staff: Anesthesiologist: Danish Power DO  C-AA: AZUL Campbell  Estimated Blood Loss: 2mL  Intra-op Medications: 1% lidocaine with epinephrine, 1: 100,000           Anesthesia Record               Intraprocedure I/O Totals          Intake    Dexmedetomidine 0.00 mL    The total shown is the total volume documented since Anesthesia Start was filed.    .00 mL    Total Intake 300 mL       Output    Est. Blood Loss 2 mL    Total Output 2 mL       Net    Net Volume 298 mL          Specimen: No specimens collected     Staff:   Circulator: Monique Chow RN  Scrub Person: Vicky Davenport    Drains and/or Catheters: None    Implants:  Implants       Type Name Action Serial No.      Implant BLOCK, SILICONE, MEDI[employer] - [id number] Implanted               Findings: mid glottic gap seen on flexible laryngoscopy, left vocal cord medialized with a 3x5mm silicone block, 4mm lateral to midline of the thyroid cartilage, with satisfactory glottic closure visualized and improved voicing    Indications: Savannah Yao is an 89 y.o. female who is having surgery for Hoarseness of voice [R49.0].     The patient was seen in the preoperative area. The risks, benefits, complications,  treatment options, non-operative alternatives, expected recovery and outcomes were discussed with the patient. The possibilities of reaction to medication, pulmonary aspiration, injury to surrounding structures, bleeding, recurrent infection, the need for additional procedures, failure to diagnose a condition, and creating a complication requiring transfusion or operation were discussed with the patient. The patient concurred with the proposed plan, giving informed consent.  The site of surgery was properly noted/marked if necessary per policy. The patient has been actively warmed in preoperative area. Preoperative antibiotics have been ordered and given within 1 hours of incision. Venous thrombosis prophylaxis have been ordered including bilateral sequential compression devices    Procedure Details: Left Thyroplasty      Operative Indications: History of right vocal cord paralysis likely 2/2 intubation injury.  Patient desired a thyroplasty as a more permanent solution to her voice concerns. The risks, benefits, and alternatives were discussed with the patient who agreed to proceed.  Planned left medialization thyroplasty.       Description of Procedure: The patient was seen in the preoperative setting and  informed consent was obtained. The patient was then taken back to the operating room and transferred to the operating room table. A preoperative timeout was then performed by Dr. King. The patient was then given IV antibiotics and then anesthesia until sedated but still arousable. The flexible laryngoscope was inserted into right nare and advanced to visualize the glottis after topical afrin and 4% lidocaine which confirmed the left vocal cord paralysis.        The anterior neck was prepped and the patient was draped in a sterile manner. An anterior neck incision was marked out and infiltrated with 1% lidocaine with epinephrine 1:100,000 and given time to take effect. A 15 blade was used to make an incision  through the skin and to the subcutaneous fat at the inferior border of the thyroid cartilage. The bovie was used to incise through the fat and the platysma. We then palpated the thyroid notch in the midline and the bovie was used to expose the notch superiorly. Once the cartilage was identified, the tissue was opened up in an inferior manner until the cricothyroid membrane and cricoid cartilage were identified.      The bovie was used to create marks for our thyroid window 4mm to the left of midline and 3mm superior to the inferior border of the thyroid cartilage.  An 11 blade was used to create the marked windows and the cartilage was removed with a duckbill elevator. The windows measured approx. 3mm x 5mm.  A soft silicone block was on the back table and silicone ramp style implant matching these measurements were fashioned, with minimal anterior phlange and 4 mm heel. The patient's sedation was lightened and the implant was first placed into the window. There was significant medialization of the vocal cord improving the voice and closing the glottic gap. Minor modifications were made to the silicone block on each side until satisfactory glottic closure was visualized. The vocal quality was also improved compared to baseline.      The silicone block was trimmed and was free of  the surrounding musculature and thyroid cartilage. The wound was copiously irrigated and hemostasis was achieved. The flexible laryngoscope was then removed. The strap muscles were reapproximated in the midline with 3-0 vicryl suture.  The platysma and deep dermal layers were reapproximated with 3-0 vicryl. The skin was reapproximated using 5-0 fast. The patient's skin was then cleaned. Mastisol and steristrips were applied. A kerlix neck wrap was then placed. The patient was then handed over the anesthesia team and successfully awakened from sedation. The patient was taken to the postoperative anesthesia care unit.       Complications:   None; patient tolerated the procedure well.    Disposition: PACU - hemodynamically stable.  Condition: stable   I was present and participated in all aspects of this procedure.

## 2023-11-16 NOTE — ANESTHESIA PREPROCEDURE EVALUATION
Patient: Savannah Yao    Procedure Information       Date/Time: 11/16/23 5721    Procedures:       Left Thyroplasty and Flexible Laryngoscopy (Left)      Laryngoscopy    Location: [facility] OR 06 / [facility]    Surgeons: Deonna King MD        ALLERGIES:  Allergies   Allergen Reactions    Aspirin Other     ulcer    Diclofenac Sodium Other     ulcer    Tramadol Other     upset stomach        MEDICAL HISTORY:  Past Medical History:   Diagnosis Date    Anemia     Khan's esophagus     CKD (chronic kidney disease)     DJD (degenerative joint disease)     DM (diabetes mellitus) (CMS/HCC)     GERD (gastroesophageal reflux disease)     Hiatal hernia     HLD (hyperlipidemia)     Hypertension     Nephrolithiasis     Personal history of other diseases of the musculoskeletal system and connective tissue 06/21/2021    History of inflammation of sacroiliac joint    Vocal cord paralysis     Left        Relevant Problems   Anesthesia (within normal limits)      Cardiovascular   (+) Benign essential HTN   (+) Carotid artery stenosis   (+) Hyperlipemia      Endocrine   (+) Controlled type 2 diabetes mellitus without complication, without long-term current use of insulin (CMS/HCC)   (+) Hypothyroid      GI   (+) GERD (gastroesophageal reflux disease)   (+) Peptic ulcer      /Renal   (+) Kidney stone   (+) Renal insufficiency      Neuro/Psych   (+) Carotid artery stenosis   (+) Lumbosacral radiculopathy      Pulmonary (within normal limits)      GI/Hepatic (within normal limits)      Hematology (within normal limits)      Musculoskeletal   (+) DJD (degenerative joint disease)   (+) Lumbar disc herniation      Eyes, Ears, Nose, and Throat (within normal limits)      Infectious Disease (within normal limits)      Digestive   (+) Khan's esophagus        SURGICAL HISTORY:  Past Surgical History:   Procedure Laterality Date    CAROTID ENDARTERECTOMY Right     CARPAL TUNNEL RELEASE       "ESOPHAGOGASTRODUODENOSCOPY      HIP FRACTURE SURGERY Right 05/05/2023    OTHER SURGICAL HISTORY  05/28/2021    Intra-articular corticosteroid injection    OTHER SURGICAL HISTORY  01/29/2021    Epidural steroid injection    THORACIC DISCECTOMY      TOTAL KNEE ARTHROPLASTY          VITALS:      11/16/2023     6:01 AM 10/9/2023     2:59 PM 9/7/2023     9:20 AM   Vitals   Systolic 198 130    Diastolic 91 70    Heart Rate 82 73    Temp 36.2 °C (97.2 °F)  37.4 °C (99.3 °F)   Resp 18     Height (in) 1.6 m (5' 3\") 1.6 m (5' 3\")    Weight (lb) 151.02 152.3    BMI 26.75 kg/m2 26.98 kg/m2    BSA (m2) 1.74 m2 1.75 m2    Visit Report  Report Report       LABS:   BMP   Lab Results   Component Value Date    GLUCOSE 92 08/15/2023    CALCIUM 9.5 08/15/2023     08/15/2023    K 4.2 08/15/2023    CO2 30 08/15/2023     08/15/2023    BUN 22 08/15/2023    CREATININE 1.02 08/15/2023   , A1C   Lab Results   Component Value Date    HGBA1C 6.6 (A) 08/15/2023   , CBC  Lab Results   Component Value Date    WBC 12.4 (H) 05/11/2023    HGB 8.6 (L) 05/11/2023    HCT 27.6 (L) 05/11/2023    MCV 98 05/11/2023     05/11/2023        IMAGES:     , CXR       XR chest 1 view 05/05/2023    Narrative  Interpreted By:  MARY ANN ALFARO DO  MRN: [id number]  Patient Name: HEMA MEDINA    STUDY:  CHEST 1 VIEW;  5/5/2023 8:26 pm    INDICATION:  hip fracture .    COMPARISON:  07/21/2015    ACCESSION NUMBER(S):  [id number]    ORDERING CLINICIAN:  JOLIE JIMÉNEZ    FINDINGS:  AP radiograph of the chest was provided.        CARDIOMEDIASTINAL SILHOUETTE:  Cardiomediastinal silhouette is stable in size and configuration.  Dense calcifications of the mitral annulus. Calcifications of the  aortic arch.    LUNGS:  Prominent interstitial opacities noted. No focal consolidation,  pleural effusion or sizable pneumothorax seen. Left apical granuloma  unchanged.    ABDOMEN:  No remarkable upper abdominal findings.    BONES:  Bones are significantly " demineralized with chronic degenerative  changes and lower thoracic moderate compression deformity, new from  2015. surgical clips over the lower cervical soft tissues.    Impression  1.  No focal consolidation or pleural effusion.  2. Demineralization and chronic T10 compression deformity which  appears new from 2015    , CT Head/Neck       CT soft tissue neck w IV contrast 08/15/2023    Narrative  Interpreted By:  JOSE C ABBASI MD  MRN: [id number]  Patient Name: HEMA MEDINA    STUDY:  CT NECK WITH CONTRAST;  8/15/2023 11:48 am    INDICATION:  Paralysis of vocal cords and larynx, unilateral. Vocal cord  hoarseness for 3 months.    COMPARISON:  None.    ACCESSION NUMBER(S):  [id number]    ORDERING CLINICIAN:  BERENICE JONES    TECHNIQUE:  Axial CT images of the neck were obtained.  The patient received 90  mL Omnipaque 350 intravenous contrast agent. The images were  reformatted in angled axial, coronal and sagittal planes.    FINDINGS:  Oral Cavity, Pharynx and Larynx:  Evaluation oral cavity is limited  by streak artifact from dental hardware.  The left tonsil has  calcifications consistent with prior inflammation. The left true  vocal cord has decreased density consistent with fatty infiltration  and slight medial is a shin of the cord. There is slight medial  rotation of the left arytenoid cartilage as well    Retropharyngeal and Prevertebral Soft Tissues: Unremarkable.    Lymph nodes: There are few non specific bilateral neck nodes,  probably reactive in etiology.    Neck vessels: Right carotid endarterectomy has been performed. The  left common carotid bifurcation has atherosclerotic calcification  extending into the internal carotid bulb with less 30% stenosis.    Thyroid gland: The thyroid gland is unremarkable in size and  appearance.    Parotid and submandibular glands: Bilateral parotid and submandibular  glands are unremarkable in appearance.    Paranasal Sinuses and Mastoids: Visualized paranasal  sinuses and  bilateral mastoids are clear.    The lenses have been replaced bilaterally. No other orbital  abnormality are noted.    Visualized upper lungs are clear.    Cervical spine has mild degenerative endplate spurring and loss of  disc height most prominent at C6-7 with the spinal canal is mildly  stenosed.    Impression  The left true vocal cord has slight medial is a shin and fatty  infiltration which can be seen with focal cord atrophy/paralysis. No  mass or inflammatory process of the neck or upper chest to account  for this is noted.      MACRO:  None    , CT Chest        CT chest w IV contrast 08/15/2023    Narrative  Interpreted By:  JOSIAH BENITEZ MD  MRN: [id number]  Patient Name: HEMA MEDINA    STUDY:  CT CHEST W CONTRAST;  8/15/2023 11:48 am    INDICATION:  Paralysis of vocal cords and larynx, unilateral.    COMPARISON:  None.    ACCESSION NUMBER(S):  [id number]    ORDERING CLINICIAN:  BERENICE JONES    TECHNIQUE:  CT of the chest was performed. Sagittal and coronal reconstructions  were generated. 90 milliliter OMNIPAQUE 350 intravenous contrast  given for the examination.    FINDINGS:      CHEST WALL AND LOWER NECK: Slight heterogeneity of the thyroid gland.  Portions of the lateral chest wall excluded from field of imaging. No  significant axillary lymphadenopathy as visualized.    MEDIASTINUM AND JASPAL:  1.1 cm nodule or lymph node in the left  superior mediastinum image 65/310. Additional subcentimeter nodes in  the anterior and middle mediastinum and right hilum. Moderate-sized  hiatal hernia containing the proximal stomach. Gaseous distention of  patulous proximal esophagus.    HEART AND VESSELS:  The heart is prominent. No significant  pericardial effusion. Moderate atherosclerotic calcifications  including the coronary arteries and mitral valve/annulus region. No  thoracic aortic aneurysm.    LUNGS, PLEURA, LARGE AIRWAYS:  Few loosely clustered subcentimeter  calcifications in the lingula.  Scattered linear and dependent  densities in each lung base. No sizable pulmonary nodule or pleural  effusion. The central airways are patent.    UPPER ABDOMEN:  Multiple rim calcified gallstones.  Subtle nodular  hypodense presumed focal fatty infiltrate anteriorly in the left lobe  of the liver near the falciform ligament.    BONES:  Diffuse osteopenia. Mild compression deformity at T10.  Multilevel endplate spurring in the included spine.    Impression  No mediastinal mass which would account for recurrent laryngeal nerve  compression and vocal cord paralysis. There is a single nonspecific  mildly enlarged lymph node in the left superior mediastinum.    Moderate-sized hiatal hernia and gaseous distention of the proximal  esophagus.    Mild cardiomegaly and prominent atherosclerotic calcifications.    Probable scarring and or atelectasis in both lung bases.    Cholelithiasis incidentally noted in the visualized upper abdomen.    Indeterminate age mild compression deformity of T10.       SOCIAL:  Social History     Tobacco Use   Smoking Status Never   Smokeless Tobacco Never      Social History     Substance and Sexual Activity   Alcohol Use Never      Social History     Substance and Sexual Activity   Drug Use Defer        NPO STATUS:  NPO/Void Status  Date of Last Liquid: 11/15/23  Time of Last Liquid: 2100  Date of Last Solid: 11/15/23  Time of Last Solid: 2100        Clinical Areas Reviewed:   Tobacco  Allergies  Meds  Problems  Med Hx  Surg Hx   Fam Hx  Soc   Hx      Physical Exam    Airway  Mallampati: II  TM distance: >3 FB  Neck ROM: full     Cardiovascular - normal exam     Dental    Pulmonary - normal exam     Abdominal - normal exam             Anesthesia Plan    ASA 3     MAC     intravenous induction   Postoperative administration of opioids is intended.  Anesthetic plan and risks discussed with patient.  Use of blood products discussed with patient who.    Plan discussed with CAA and  attending.

## 2023-11-16 NOTE — DISCHARGE INSTRUCTIONS
You had a left thyroplasty  It is ok to use your voice conservatively.  This means no extended voice use, no shouting/yelling, and talking only if you can touch the person you are talking to.   Avoid coughing and clearing.   My office will call tomorrow to schedule you for your outpatient followup with Dr King and with Yesika Srinivasan SLP.  You may advance your diet as tolerated.  Leave the dressing in place until Saturday.  Then OK to remove and shower.  You have dissolvable sutures which will not need removal.      For discomfort, please use over the counter Acetaminophen (Tylenol) or Ibuprofen (Advil. Motrin) for pain as directed by the packaging.     Please call the office to talk with my nurse for any issues such as fever, chills, or questions at [phone], or my  at [phone], press 2.  If after hours or on the weekends, please call [phone], and ask for the ENT resident on call, for assistance.

## 2023-11-16 NOTE — ANESTHESIA POSTPROCEDURE EVALUATION
Patient: Savannah Yao    Procedure Summary       Date: 11/16/23 Room / Location: [facility] OR 06 / [facility]    Anesthesia Start: 0724 Anesthesia Stop: 0916    Procedures:       Left Thyroplasty and Flexible Laryngoscopy (Left)      Laryngoscopy Diagnosis:       Hoarseness of voice      (Hoarseness of voice [R49.0])    Surgeons: Deonna King MD Responsible Provider: Danish Power DO    Anesthesia Type: MAC ASA Status: 3            Anesthesia Type: MAC    Vitals Value Taken Time   /65 11/16/23 1015   Temp 36.7 °C (98.1 °F) 11/16/23 1015   Pulse 78 11/16/23 1015   Resp 14 11/16/23 1015   SpO2 96 % 11/16/23 1015       Anesthesia Post Evaluation    Patient location during evaluation: PACU  Patient participation: complete - patient participated  Level of consciousness: awake  Pain score: 0  Pain management: adequate  Multimodal analgesia pain management approach  Airway patency: patent  Two or more strategies used to mitigate risk of obstructive sleep apnea  Cardiovascular status: acceptable  Respiratory status: acceptable  Hydration status: acceptable  Postoperative Nausea and Vomiting: none        No notable events documented.

## 2023-11-17 ENCOUNTER — TELEPHONE (OUTPATIENT)
Dept: OTOLARYNGOLOGY | Facility: CLINIC | Age: 88
End: 2023-11-17

## 2023-11-17 ASSESSMENT — PAIN SCALES - GENERAL: PAINLEVEL_OUTOF10: 3

## 2023-11-21 ENCOUNTER — TELEPHONE (OUTPATIENT)
Dept: OTOLARYNGOLOGY | Facility: CLINIC | Age: 88
End: 2023-11-21

## 2023-11-21 NOTE — TELEPHONE ENCOUNTER
Called pt back to answer her question. Pt states she took off the large wound wrap and there is a small bandage remaining. Pt would like to know whether or not she can remove this. Informed pt to let it come off on its own because this could be the steri strips and they should remain on for at least 1 week after surgery. Pt stated ok thank you. Dr. King aware. No further concerns at this time.

## 2023-12-26 ENCOUNTER — OFFICE VISIT (OUTPATIENT)
Dept: OTOLARYNGOLOGY | Facility: CLINIC | Age: 88
End: 2023-12-26
Payer: MEDICARE

## 2023-12-26 VITALS — HEIGHT: 63 IN | WEIGHT: 154 LBS | TEMPERATURE: 97.2 F | BODY MASS INDEX: 27.29 KG/M2

## 2023-12-26 DIAGNOSIS — J38.01 PARALYSIS OF LEFT VOCAL CORD: ICD-10-CM

## 2023-12-26 DIAGNOSIS — R49.0 HOARSENESS OF VOICE: Primary | ICD-10-CM

## 2023-12-26 PROCEDURE — 1125F AMNT PAIN NOTED PAIN PRSNT: CPT | Performed by: OTOLARYNGOLOGY

## 2023-12-26 PROCEDURE — 31579 LARYNGOSCOPY TELESCOPIC: CPT | Performed by: OTOLARYNGOLOGY

## 2023-12-26 PROCEDURE — 1160F RVW MEDS BY RX/DR IN RCRD: CPT | Performed by: OTOLARYNGOLOGY

## 2023-12-26 PROCEDURE — 1159F MED LIST DOCD IN RCRD: CPT | Performed by: OTOLARYNGOLOGY

## 2023-12-26 PROCEDURE — 1036F TOBACCO NON-USER: CPT | Performed by: OTOLARYNGOLOGY

## 2023-12-26 ASSESSMENT — PATIENT HEALTH QUESTIONNAIRE - PHQ9
1. LITTLE INTEREST OR PLEASURE IN DOING THINGS: NOT AT ALL
2. FEELING DOWN, DEPRESSED OR HOPELESS: NOT AT ALL
SUM OF ALL RESPONSES TO PHQ9 QUESTIONS 1 AND 2: 0

## 2023-12-26 NOTE — PROGRESS NOTES
Chief Complaint  Chief Complaint   Patient presents with    Follow-up    chronic horseness        Pertinent History:  chronic hoarseness with clinical findings of a persistent left vocal cord paralysis, right vocal cord paresis and glottic insufficiency.     Interval History (2023):   She is s/p left thyroplasty on 2023. Since the procedure, she has been doing well. She is able to use her voice comfortably. No effortful phonation.     Exam:  VOICE: Variable hoarseness that improves to normal with breath support   RESPIRATION: Breathing comfortably, no stridor.    ORAL CAVITY/OROPHARYNX/LIPS: Normal mucous membranes, normal floor of mouth/tongue/OP, no masses or lesions are noted.    SKIN: Neck skin with a well healing scar  PSYCH: Alert and oriented with appropriate mood and affect.       PROCEDURE NOTE:  Recommended stroboscopy.  Risks, benefits,  and alternatives were explained.   wished to proceed and provides verbal consent.     PROCEDURE: Flexible laryngoscopy with stroboscopy, CPT 51427     POSTPROCEDURE DIAGNOSIS: Hoarseness     INDICATIONS: Inability to tolerate mirror exam or abnormal findings on mirror, Stroboscopy performed to assess one of the followin. Diagnosis of symptomatic disorder involving the voice, swallow, upper aerodigestive tract, including ALIVIA disorders, or  2. Preoperative evaluation of vocal cord function for individuals undergoing surgery where the RLN or vagus nerves are at risk of injury, or  3. Further evaluation of abnormalities of the upper aerodigestive tract discovered by another modality, such as CT, MRI, bronchoscopy or EGD    Description of Procedure:    After adequate afrin and lidocaine spray, I advanced the endoscope.  Visualization of the nasopharynx, vallecula, posterior pharyngeal walls, pyriform, epiglottis and post cricoid areas was unremarkable.  The following laryngeal findings were noted:    vocal cord movement was asymmetric, left with immobility    closure was complete with good breath support.   Mucosal wave was increased bilaterally, right > left   Compression was increased right FVC > AP due to scope effect   the subglottis was widely patent  Pharyngeal wall squeeze was normal      Procedure well tolerated.     Assessment and Plan:  This is a follow up visit for chronic hoarseness with clinical findings of a persistent left vocal cord paralysis, right vocal cord paresis and glottic insufficiency s/p left thyroplasty on 11/16/2023. She was assured that the implant is in good positioning.     We discussed:  She was educated on scar management with gentle massage and SPF use.   She will follow up with me as needed. There are no restrictions to her voice.     The patient's questions were answered.    Scribe Attestation  By signing my name below, I, Donna Cesar , Scribmike attest that this documentation has been prepared under the direction and in the presence of Deonna King MD.

## 2024-01-04 ENCOUNTER — LAB (OUTPATIENT)
Dept: LAB | Facility: LAB | Age: 89
End: 2024-01-04
Payer: COMMERCIAL

## 2024-01-04 DIAGNOSIS — N28.9 RENAL INSUFFICIENCY: ICD-10-CM

## 2024-01-04 DIAGNOSIS — E78.2 MIXED HYPERLIPIDEMIA: ICD-10-CM

## 2024-01-04 DIAGNOSIS — E11.9 CONTROLLED TYPE 2 DIABETES MELLITUS WITHOUT COMPLICATION, WITHOUT LONG-TERM CURRENT USE OF INSULIN (MULTI): ICD-10-CM

## 2024-01-04 DIAGNOSIS — I10 BENIGN ESSENTIAL HTN: ICD-10-CM

## 2024-01-04 LAB
ALBUMIN SERPL BCP-MCNC: 3.9 G/DL (ref 3.4–5)
ALP SERPL-CCNC: 56 U/L (ref 33–136)
ALT SERPL W P-5'-P-CCNC: 15 U/L (ref 7–45)
ANION GAP SERPL CALC-SCNC: 11 MMOL/L (ref 10–20)
AST SERPL W P-5'-P-CCNC: 21 U/L (ref 9–39)
BASOPHILS # BLD AUTO: 0.03 X10*3/UL (ref 0–0.1)
BASOPHILS NFR BLD AUTO: 0.3 %
BILIRUB SERPL-MCNC: 0.8 MG/DL (ref 0–1.2)
BUN SERPL-MCNC: 28 MG/DL (ref 6–23)
CALCIUM SERPL-MCNC: 9.3 MG/DL (ref 8.6–10.3)
CHLORIDE SERPL-SCNC: 105 MMOL/L (ref 98–107)
CO2 SERPL-SCNC: 31 MMOL/L (ref 21–32)
CREAT SERPL-MCNC: 1.07 MG/DL (ref 0.5–1.05)
EOSINOPHIL # BLD AUTO: 0.28 X10*3/UL (ref 0–0.4)
EOSINOPHIL NFR BLD AUTO: 2.7 %
ERYTHROCYTE [DISTWIDTH] IN BLOOD BY AUTOMATED COUNT: 12.7 % (ref 11.5–14.5)
EST. AVERAGE GLUCOSE BLD GHB EST-MCNC: 140 MG/DL
GFR SERPL CREATININE-BSD FRML MDRD: 50 ML/MIN/1.73M*2
GLUCOSE SERPL-MCNC: 117 MG/DL (ref 74–99)
HBA1C MFR BLD: 6.5 %
HCT VFR BLD AUTO: 36.7 % (ref 36–46)
HGB BLD-MCNC: 11.7 G/DL (ref 12–16)
IMM GRANULOCYTES # BLD AUTO: 0.03 X10*3/UL (ref 0–0.5)
IMM GRANULOCYTES NFR BLD AUTO: 0.3 % (ref 0–0.9)
LDLC SERPL DIRECT ASSAY-MCNC: 78 MG/DL (ref 0–129)
LYMPHOCYTES # BLD AUTO: 3.5 X10*3/UL (ref 0.8–3)
LYMPHOCYTES NFR BLD AUTO: 34.2 %
MCH RBC QN AUTO: 30.6 PG (ref 26–34)
MCHC RBC AUTO-ENTMCNC: 31.9 G/DL (ref 32–36)
MCV RBC AUTO: 96 FL (ref 80–100)
MONOCYTES # BLD AUTO: 1.26 X10*3/UL (ref 0.05–0.8)
MONOCYTES NFR BLD AUTO: 12.3 %
NEUTROPHILS # BLD AUTO: 5.12 X10*3/UL (ref 1.6–5.5)
NEUTROPHILS NFR BLD AUTO: 50.2 %
NRBC BLD-RTO: 0 /100 WBCS (ref 0–0)
PLATELET # BLD AUTO: 181 X10*3/UL (ref 150–450)
POTASSIUM SERPL-SCNC: 4.3 MMOL/L (ref 3.5–5.3)
PROT SERPL-MCNC: 6.4 G/DL (ref 6.4–8.2)
RBC # BLD AUTO: 3.82 X10*6/UL (ref 4–5.2)
SODIUM SERPL-SCNC: 143 MMOL/L (ref 136–145)
WBC # BLD AUTO: 10.2 X10*3/UL (ref 4.4–11.3)

## 2024-01-04 PROCEDURE — 83721 ASSAY OF BLOOD LIPOPROTEIN: CPT

## 2024-01-04 PROCEDURE — 36415 COLL VENOUS BLD VENIPUNCTURE: CPT

## 2024-01-04 PROCEDURE — 85025 COMPLETE CBC W/AUTO DIFF WBC: CPT

## 2024-01-04 PROCEDURE — 83036 HEMOGLOBIN GLYCOSYLATED A1C: CPT

## 2024-01-04 PROCEDURE — 80053 COMPREHEN METABOLIC PANEL: CPT

## 2024-01-10 ENCOUNTER — OFFICE VISIT (OUTPATIENT)
Dept: PRIMARY CARE | Facility: CLINIC | Age: 89
End: 2024-01-10
Payer: COMMERCIAL

## 2024-01-10 VITALS
BODY MASS INDEX: 27.25 KG/M2 | HEIGHT: 63 IN | WEIGHT: 153.8 LBS | SYSTOLIC BLOOD PRESSURE: 150 MMHG | DIASTOLIC BLOOD PRESSURE: 64 MMHG | OXYGEN SATURATION: 95 % | HEART RATE: 75 BPM

## 2024-01-10 DIAGNOSIS — M80.80XS LOCALIZED OSTEOPOROSIS WITH CURRENT PATHOLOGICAL FRACTURE, SEQUELA: ICD-10-CM

## 2024-01-10 DIAGNOSIS — K21.9 GASTROESOPHAGEAL REFLUX DISEASE WITHOUT ESOPHAGITIS: ICD-10-CM

## 2024-01-10 DIAGNOSIS — E03.9 ACQUIRED HYPOTHYROIDISM: ICD-10-CM

## 2024-01-10 DIAGNOSIS — E11.9 CONTROLLED TYPE 2 DIABETES MELLITUS WITHOUT COMPLICATION, WITHOUT LONG-TERM CURRENT USE OF INSULIN (MULTI): ICD-10-CM

## 2024-01-10 DIAGNOSIS — Z00.00 ROUTINE GENERAL MEDICAL EXAMINATION AT HEALTH CARE FACILITY: Primary | ICD-10-CM

## 2024-01-10 DIAGNOSIS — E78.2 MIXED HYPERLIPIDEMIA: ICD-10-CM

## 2024-01-10 DIAGNOSIS — N28.9 RENAL INSUFFICIENCY: ICD-10-CM

## 2024-01-10 DIAGNOSIS — I10 BENIGN ESSENTIAL HTN: ICD-10-CM

## 2024-01-10 PROBLEM — S72.001D CLOSED FRACTURE OF RIGHT HIP WITH ROUTINE HEALING: Status: RESOLVED | Noted: 2023-05-22 | Resolved: 2024-01-10

## 2024-01-10 PROCEDURE — 1125F AMNT PAIN NOTED PAIN PRSNT: CPT | Performed by: FAMILY MEDICINE

## 2024-01-10 PROCEDURE — 1159F MED LIST DOCD IN RCRD: CPT | Performed by: FAMILY MEDICINE

## 2024-01-10 PROCEDURE — 99213 OFFICE O/P EST LOW 20 MIN: CPT | Performed by: FAMILY MEDICINE

## 2024-01-10 PROCEDURE — 1160F RVW MEDS BY RX/DR IN RCRD: CPT | Performed by: FAMILY MEDICINE

## 2024-01-10 PROCEDURE — 1170F FXNL STATUS ASSESSED: CPT | Performed by: FAMILY MEDICINE

## 2024-01-10 PROCEDURE — G0439 PPPS, SUBSEQ VISIT: HCPCS | Performed by: FAMILY MEDICINE

## 2024-01-10 PROCEDURE — 3077F SYST BP >= 140 MM HG: CPT | Performed by: FAMILY MEDICINE

## 2024-01-10 PROCEDURE — 3078F DIAST BP <80 MM HG: CPT | Performed by: FAMILY MEDICINE

## 2024-01-10 PROCEDURE — 1036F TOBACCO NON-USER: CPT | Performed by: FAMILY MEDICINE

## 2024-01-10 RX ORDER — LEVOTHYROXINE SODIUM 75 UG/1
75 TABLET ORAL DAILY
Qty: 90 TABLET | Refills: 3 | Status: SHIPPED | OUTPATIENT
Start: 2024-01-10 | End: 2025-01-09

## 2024-01-10 RX ORDER — ATENOLOL 50 MG/1
50 TABLET ORAL DAILY
Qty: 90 TABLET | Refills: 3 | Status: SHIPPED | OUTPATIENT
Start: 2024-01-10 | End: 2025-01-09

## 2024-01-10 RX ORDER — PANTOPRAZOLE SODIUM 40 MG/1
40 TABLET, DELAYED RELEASE ORAL DAILY
Qty: 90 TABLET | Refills: 3 | Status: SHIPPED | OUTPATIENT
Start: 2024-01-10 | End: 2025-01-09

## 2024-01-10 RX ORDER — AMLODIPINE BESYLATE 2.5 MG/1
2.5 TABLET ORAL DAILY
Qty: 90 TABLET | Refills: 3 | Status: SHIPPED | OUTPATIENT
Start: 2024-01-10 | End: 2025-01-09

## 2024-01-10 ASSESSMENT — ENCOUNTER SYMPTOMS
TROUBLE SWALLOWING: 0
HEADACHES: 0
NAUSEA: 0
DEPRESSION: 0
RHINORRHEA: 0
OCCASIONAL FEELINGS OF UNSTEADINESS: 0
VOMITING: 0
ARTHRALGIAS: 1
APPETITE CHANGE: 0
ABDOMINAL DISTENTION: 0
CONSTIPATION: 0
DIZZINESS: 0
ACTIVITY CHANGE: 0
WHEEZING: 0
PALPITATIONS: 0
SLEEP DISTURBANCE: 0
ABDOMINAL PAIN: 0
LIGHT-HEADEDNESS: 0
NUMBNESS: 0
UNEXPECTED WEIGHT CHANGE: 0
SHORTNESS OF BREATH: 0
COUGH: 0
DIARRHEA: 0
DIFFICULTY URINATING: 0
LOSS OF SENSATION IN FEET: 0
FATIGUE: 0
ADENOPATHY: 0

## 2024-01-10 ASSESSMENT — ACTIVITIES OF DAILY LIVING (ADL)
GROCERY_SHOPPING: INDEPENDENT
DRESSING: INDEPENDENT
DOING_HOUSEWORK: INDEPENDENT
BATHING: INDEPENDENT
TAKING_MEDICATION: INDEPENDENT
MANAGING_FINANCES: INDEPENDENT

## 2024-01-10 NOTE — PROGRESS NOTES
Subjective   Reason for Visit: Savannah Yao is an 89 y.o. female here for a Medicare Wellness visit.     Past Medical, Surgical, and Family History reviewed and updated in chart.    Reviewed all medications by prescribing practitioner or clinical pharmacist (such as prescriptions, OTCs, herbal therapies and supplements) and documented in the medical record.    HPI  No headache, chest pain, shortness of breath, dizziness, lightheadedness, or edema  The patient is taking thyroid medications as directed without problems, labs done in the last year, no symptoms of excessive fatigue, edema or weight gain.  Taking PPI daily without breakthrough symptoms.  Reviewed dietary, caffeine, tobacco, alcohol, and NSAID use.  No dyspepsia, dysphagia, reflux, melena, or abdominal pain.  No low blood sugars since last OV, seen opthalmology in the past year, and no numbness or tingling in feet, skin normal.  Voice better, had procedure on vocal cords  Using a walker to get around, no falls, hip pain OK      Patient Care Team:  Earl Sandy MD as PCP - General (Family Medicine)  Earl Sandy MD as PCP - [employer] PCP     Review of Systems   Constitutional:  Negative for activity change, appetite change, fatigue and unexpected weight change.   HENT:  Negative for ear pain, nosebleeds, rhinorrhea, sneezing and trouble swallowing.    Respiratory:  Negative for cough, shortness of breath and wheezing.    Cardiovascular:  Negative for chest pain, palpitations and leg swelling.   Gastrointestinal:  Negative for abdominal distention, abdominal pain, constipation, diarrhea, nausea and vomiting.   Genitourinary:  Negative for difficulty urinating.   Musculoskeletal:  Positive for arthralgias and gait problem.   Skin:  Negative for rash.   Neurological:  Negative for dizziness, light-headedness, numbness and headaches.   Hematological:  Negative for adenopathy.   Psychiatric/Behavioral:  Negative for  "behavioral problems and sleep disturbance.    All other systems reviewed and are negative.      Objective   Vitals:  /64   Pulse 75   Ht 1.6 m (5' 3\")   Wt 69.8 kg (153 lb 12.8 oz)   SpO2 95%   BMI 27.24 kg/m²       Physical Exam  Vitals and nursing note reviewed.   Constitutional:       Appearance: Normal appearance.   HENT:      Head: Normocephalic and atraumatic.      Right Ear: Tympanic membrane, ear canal and external ear normal.      Left Ear: Tympanic membrane, ear canal and external ear normal.      Nose: Nose normal.      Mouth/Throat:      Mouth: Mucous membranes are moist.      Pharynx: Oropharynx is clear.   Cardiovascular:      Rate and Rhythm: Normal rate and regular rhythm.      Pulses: Normal pulses.      Heart sounds: Normal heart sounds.   Pulmonary:      Effort: Pulmonary effort is normal.      Breath sounds: Normal breath sounds.   Musculoskeletal:      Cervical back: Normal range of motion and neck supple.   Neurological:      Mental Status: She is alert.   Psychiatric:         Mood and Affect: Mood normal.         Behavior: Behavior normal.         Assessment/Plan   Problem List Items Addressed This Visit       Benign essential HTN    Current Assessment & Plan     Blood pressure stable, renal function stable, no change with medication.         Relevant Medications    amLODIPine (Norvasc) 2.5 mg tablet    atenolol (Tenormin) 50 mg tablet    Other Relevant Orders    Follow Up In Primary Care - Established    Albumin , Urine Random    Comprehensive Metabolic Panel    Controlled type 2 diabetes mellitus without complication, without long-term current use of insulin (CMS/[facility])    Current Assessment & Plan     A1c testing below 7, tolerating diet, no prescription needed at this point.         Relevant Orders    Follow Up In Primary Care - Established    Albumin , Urine Random    Comprehensive Metabolic Panel    Hemoglobin A1C    Thyroid Stimulating Hormone    Lipid Panel    GERD " (gastroesophageal reflux disease)    Current Assessment & Plan     Stable with medication, no issues with dysphagia.         Relevant Medications    pantoprazole (ProtoNix) 40 mg EC tablet    Other Relevant Orders    Follow Up In Primary Care - Established    Hyperlipemia    Current Assessment & Plan     Lab at goal, no change.         Relevant Orders    Follow Up In Primary Care - Established    Comprehensive Metabolic Panel    Lipid Panel    Hypothyroid    Current Assessment & Plan     Stable no change.         Relevant Medications    levothyroxine (Synthroid, Levoxyl) 75 mcg tablet    Other Relevant Orders    Follow Up In Primary Care - Established    Thyroid Stimulating Hormone    Osteoporosis    Current Assessment & Plan     Patient continues with calcium and vitamin D, advised about options for treating osteoporosis given her hip fracture, patient defers Reclast or Prolia despite .         Relevant Orders    Follow Up In Primary Care - Established    Renal insufficiency    Current Assessment & Plan     Renal function stable, tolerating medication, no change.         Relevant Orders    Follow Up In Primary Care - Established    Albumin , Urine Random    Comprehensive Metabolic Panel    CBC and Auto Differential     Other Visit Diagnoses       Routine general medical examination at health care facility    -  Primary    Relevant Orders    Follow Up In Primary Care - Established             Patient was identified as a fall risk. Risk prevention instructions provided.

## 2024-01-10 NOTE — PATIENT INSTRUCTIONS

## 2024-06-24 ENCOUNTER — LAB (OUTPATIENT)
Dept: LAB | Facility: LAB | Age: 89
End: 2024-06-24
Payer: COMMERCIAL

## 2024-06-24 DIAGNOSIS — E03.9 ACQUIRED HYPOTHYROIDISM: ICD-10-CM

## 2024-06-24 DIAGNOSIS — E78.2 MIXED HYPERLIPIDEMIA: ICD-10-CM

## 2024-06-24 DIAGNOSIS — N28.9 RENAL INSUFFICIENCY: ICD-10-CM

## 2024-06-24 DIAGNOSIS — I10 BENIGN ESSENTIAL HTN: ICD-10-CM

## 2024-06-24 DIAGNOSIS — E11.9 CONTROLLED TYPE 2 DIABETES MELLITUS WITHOUT COMPLICATION, WITHOUT LONG-TERM CURRENT USE OF INSULIN (MULTI): ICD-10-CM

## 2024-06-24 LAB
ALBUMIN SERPL BCP-MCNC: 4 G/DL (ref 3.4–5)
ALP SERPL-CCNC: 44 U/L (ref 33–136)
ALT SERPL W P-5'-P-CCNC: 15 U/L (ref 7–45)
ANION GAP SERPL CALC-SCNC: 10 MMOL/L (ref 10–20)
AST SERPL W P-5'-P-CCNC: 21 U/L (ref 9–39)
BASOPHILS # BLD AUTO: 0.04 X10*3/UL (ref 0–0.1)
BASOPHILS NFR BLD AUTO: 0.4 %
BILIRUB SERPL-MCNC: 1.3 MG/DL (ref 0–1.2)
BUN SERPL-MCNC: 22 MG/DL (ref 6–23)
CALCIUM SERPL-MCNC: 9.2 MG/DL (ref 8.6–10.3)
CHLORIDE SERPL-SCNC: 103 MMOL/L (ref 98–107)
CHOLEST SERPL-MCNC: 166 MG/DL (ref 0–199)
CHOLESTEROL/HDL RATIO: 2.8
CO2 SERPL-SCNC: 33 MMOL/L (ref 21–32)
CREAT SERPL-MCNC: 1.18 MG/DL (ref 0.5–1.05)
CREAT UR-MCNC: 69.8 MG/DL (ref 20–320)
EGFRCR SERPLBLD CKD-EPI 2021: 44 ML/MIN/1.73M*2
EOSINOPHIL # BLD AUTO: 0.25 X10*3/UL (ref 0–0.4)
EOSINOPHIL NFR BLD AUTO: 2.5 %
ERYTHROCYTE [DISTWIDTH] IN BLOOD BY AUTOMATED COUNT: 13.1 % (ref 11.5–14.5)
EST. AVERAGE GLUCOSE BLD GHB EST-MCNC: 146 MG/DL
GLUCOSE SERPL-MCNC: 103 MG/DL (ref 74–99)
HBA1C MFR BLD: 6.7 %
HCT VFR BLD AUTO: 37.8 % (ref 36–46)
HDLC SERPL-MCNC: 59 MG/DL
HGB BLD-MCNC: 12 G/DL (ref 12–16)
IMM GRANULOCYTES # BLD AUTO: 0.02 X10*3/UL (ref 0–0.5)
IMM GRANULOCYTES NFR BLD AUTO: 0.2 % (ref 0–0.9)
LDLC SERPL CALC-MCNC: 84 MG/DL
LYMPHOCYTES # BLD AUTO: 4.31 X10*3/UL (ref 0.8–3)
LYMPHOCYTES NFR BLD AUTO: 42.3 %
MCH RBC QN AUTO: 30.7 PG (ref 26–34)
MCHC RBC AUTO-ENTMCNC: 31.7 G/DL (ref 32–36)
MCV RBC AUTO: 97 FL (ref 80–100)
MICROALBUMIN UR-MCNC: <7 MG/L
MICROALBUMIN/CREAT UR: NORMAL MG/G{CREAT}
MONOCYTES # BLD AUTO: 1.19 X10*3/UL (ref 0.05–0.8)
MONOCYTES NFR BLD AUTO: 11.7 %
NEUTROPHILS # BLD AUTO: 4.37 X10*3/UL (ref 1.6–5.5)
NEUTROPHILS NFR BLD AUTO: 42.9 %
NON HDL CHOLESTEROL: 107 MG/DL (ref 0–149)
NRBC BLD-RTO: 0 /100 WBCS (ref 0–0)
PLATELET # BLD AUTO: 167 X10*3/UL (ref 150–450)
POTASSIUM SERPL-SCNC: 4.1 MMOL/L (ref 3.5–5.3)
PROT SERPL-MCNC: 6.4 G/DL (ref 6.4–8.2)
RBC # BLD AUTO: 3.91 X10*6/UL (ref 4–5.2)
SODIUM SERPL-SCNC: 142 MMOL/L (ref 136–145)
TRIGL SERPL-MCNC: 116 MG/DL (ref 0–149)
TSH SERPL-ACNC: 1.88 MIU/L (ref 0.44–3.98)
VLDL: 23 MG/DL (ref 0–40)
WBC # BLD AUTO: 10.2 X10*3/UL (ref 4.4–11.3)

## 2024-06-24 PROCEDURE — 36415 COLL VENOUS BLD VENIPUNCTURE: CPT

## 2024-06-24 PROCEDURE — 82570 ASSAY OF URINE CREATININE: CPT

## 2024-06-24 PROCEDURE — 84443 ASSAY THYROID STIM HORMONE: CPT

## 2024-06-24 PROCEDURE — 80053 COMPREHEN METABOLIC PANEL: CPT

## 2024-06-24 PROCEDURE — 80061 LIPID PANEL: CPT

## 2024-06-24 PROCEDURE — 83036 HEMOGLOBIN GLYCOSYLATED A1C: CPT

## 2024-06-24 PROCEDURE — 82043 UR ALBUMIN QUANTITATIVE: CPT

## 2024-06-24 PROCEDURE — 85025 COMPLETE CBC W/AUTO DIFF WBC: CPT

## 2024-07-02 ENCOUNTER — APPOINTMENT (OUTPATIENT)
Dept: PRIMARY CARE | Facility: CLINIC | Age: 89
End: 2024-07-02
Payer: COMMERCIAL

## 2024-07-02 VITALS
HEIGHT: 63 IN | DIASTOLIC BLOOD PRESSURE: 70 MMHG | OXYGEN SATURATION: 96 % | WEIGHT: 151.8 LBS | SYSTOLIC BLOOD PRESSURE: 150 MMHG | HEART RATE: 71 BPM | BODY MASS INDEX: 26.9 KG/M2

## 2024-07-02 DIAGNOSIS — E03.9 ACQUIRED HYPOTHYROIDISM: ICD-10-CM

## 2024-07-02 DIAGNOSIS — N28.9 RENAL INSUFFICIENCY: ICD-10-CM

## 2024-07-02 DIAGNOSIS — K21.9 GASTROESOPHAGEAL REFLUX DISEASE WITHOUT ESOPHAGITIS: ICD-10-CM

## 2024-07-02 DIAGNOSIS — E11.9 CONTROLLED TYPE 2 DIABETES MELLITUS WITHOUT COMPLICATION, WITHOUT LONG-TERM CURRENT USE OF INSULIN (MULTI): ICD-10-CM

## 2024-07-02 DIAGNOSIS — E78.2 MIXED HYPERLIPIDEMIA: ICD-10-CM

## 2024-07-02 DIAGNOSIS — I10 BENIGN ESSENTIAL HTN: Primary | ICD-10-CM

## 2024-07-02 PROCEDURE — 1123F ACP DISCUSS/DSCN MKR DOCD: CPT | Performed by: FAMILY MEDICINE

## 2024-07-02 PROCEDURE — 99213 OFFICE O/P EST LOW 20 MIN: CPT | Performed by: FAMILY MEDICINE

## 2024-07-02 PROCEDURE — 1158F ADVNC CARE PLAN TLK DOCD: CPT | Performed by: FAMILY MEDICINE

## 2024-07-02 PROCEDURE — 3077F SYST BP >= 140 MM HG: CPT | Performed by: FAMILY MEDICINE

## 2024-07-02 PROCEDURE — 1159F MED LIST DOCD IN RCRD: CPT | Performed by: FAMILY MEDICINE

## 2024-07-02 PROCEDURE — 1036F TOBACCO NON-USER: CPT | Performed by: FAMILY MEDICINE

## 2024-07-02 PROCEDURE — 1160F RVW MEDS BY RX/DR IN RCRD: CPT | Performed by: FAMILY MEDICINE

## 2024-07-02 PROCEDURE — 3078F DIAST BP <80 MM HG: CPT | Performed by: FAMILY MEDICINE

## 2024-07-02 RX ORDER — AMLODIPINE BESYLATE 5 MG/1
5 TABLET ORAL DAILY
Qty: 90 TABLET | Refills: 3 | Status: SHIPPED | OUTPATIENT
Start: 2024-07-02 | End: 2025-07-02

## 2024-07-02 ASSESSMENT — ENCOUNTER SYMPTOMS
SLEEP DISTURBANCE: 0
SORE THROAT: 0
ADENOPATHY: 0
VOICE CHANGE: 1
NAUSEA: 0
DIZZINESS: 0
NUMBNESS: 0
ARTHRALGIAS: 0
BACK PAIN: 0
SHORTNESS OF BREATH: 0
FATIGUE: 0
DIARRHEA: 0
WHEEZING: 0
CONSTIPATION: 0
HEADACHES: 0
APPETITE CHANGE: 0
PALPITATIONS: 0
LIGHT-HEADEDNESS: 0
VOMITING: 0
ABDOMINAL DISTENTION: 0
ACTIVITY CHANGE: 0
NERVOUS/ANXIOUS: 0
ABDOMINAL PAIN: 0
RHINORRHEA: 0
DIFFICULTY URINATING: 0
TROUBLE SWALLOWING: 0
DYSPHORIC MOOD: 0
UNEXPECTED WEIGHT CHANGE: 0
COUGH: 0

## 2024-07-02 NOTE — PROGRESS NOTES
Subjective   Patient ID: Savannah Yao is a 89 y.o. female who presents for 6 MO LABS.    HPI   No headache, chest pain, shortness of breath, dizziness, lightheadedness, or edema  The patient is taking thyroid medications as directed without problems, labs done in the last year, no symptoms of excessive fatigue, edema or weight gain.  Taking PPI daily without breakthrough symptoms.  Reviewed dietary, caffeine, tobacco, alcohol, and NSAID use.  No dyspepsia, dysphagia, reflux, melena, or abdominal pain.  No low blood sugars since last OV, seen opthalmology in the past year, and no numbness or tingling in feet, skin normal.  Seen vascular in January, no change in treatment  Seen orthopedics in May, no change, had hip fracture over a year ago, no falls in the past 6 months  Voice comes and goes, no issues swallowing  Using a walker, no falls, occ pain in hip, no OTC meds needed  HBP usually less than 140/90    Review of Systems   Constitutional:  Negative for activity change, appetite change, fatigue and unexpected weight change.   HENT:  Positive for voice change. Negative for congestion, ear pain, nosebleeds, rhinorrhea, sneezing, sore throat and trouble swallowing.    Respiratory:  Negative for cough, shortness of breath and wheezing.    Cardiovascular:  Negative for chest pain, palpitations and leg swelling.   Gastrointestinal:  Negative for abdominal distention, abdominal pain, constipation, diarrhea, nausea and vomiting.   Genitourinary:  Negative for difficulty urinating.   Musculoskeletal:  Positive for gait problem. Negative for arthralgias and back pain.   Skin:  Negative for rash.   Neurological:  Negative for dizziness, light-headedness, numbness and headaches.   Hematological:  Negative for adenopathy.   Psychiatric/Behavioral:  Negative for behavioral problems, dysphoric mood and sleep disturbance. The patient is not nervous/anxious.    All other systems reviewed and are negative.      Objective   BP  "150/70   Pulse 71   Ht 1.6 m (5' 3\")   Wt 68.9 kg (151 lb 12.8 oz)   SpO2 96%   BMI 26.89 kg/m²     Physical Exam  Vitals and nursing note reviewed.   Constitutional:       Appearance: Normal appearance.   HENT:      Head: Normocephalic and atraumatic.      Right Ear: Tympanic membrane, ear canal and external ear normal.      Left Ear: Tympanic membrane, ear canal and external ear normal.      Nose: Nose normal.      Mouth/Throat:      Mouth: Mucous membranes are moist.      Pharynx: Oropharynx is clear.   Cardiovascular:      Rate and Rhythm: Normal rate and regular rhythm.      Pulses: Normal pulses.      Heart sounds: Normal heart sounds.   Pulmonary:      Effort: Pulmonary effort is normal.      Breath sounds: Normal breath sounds.   Musculoskeletal:      Cervical back: Normal range of motion and neck supple.   Neurological:      Mental Status: She is alert.   Psychiatric:         Mood and Affect: Mood normal.         Behavior: Behavior normal.         Assessment/Plan   Problem List Items Addressed This Visit             ICD-10-CM    Benign essential HTN - Primary I10     Blood pressure recheck was still elevated, increase amlodipine from 2-1/2 up to 5 mg once a day, home blood pressure goal to be less than 140/90 consistently.         Relevant Medications    amLODIPine (Norvasc) 5 mg tablet    Other Relevant Orders    Comprehensive Metabolic Panel    Follow Up In Primary Care - Established    Controlled type 2 diabetes mellitus without complication, without long-term current use of insulin (Multi) E11.9     A1c testing below 6.5, advised about diet and exercise.         Relevant Orders    Cholesterol, LDL Direct    Comprehensive Metabolic Panel    Hemoglobin A1C    Follow Up In Primary Care - Established    GERD (gastroesophageal reflux disease) K21.9     Stable with current PPI         Relevant Orders    Follow Up In Primary Care - Established    Hyperlipemia E78.5     Labs stable continue with current " dose of rosuvastatin.         Relevant Orders    Cholesterol, LDL Direct    Comprehensive Metabolic Panel    Follow Up In Primary Care - Established    Hypothyroid E03.9     Will be due for TSH at follow-up.         Relevant Orders    Follow Up In Primary Care - Established    Renal insufficiency N28.9     Stable currently.         Relevant Orders    Comprehensive Metabolic Panel    Follow Up In Primary Care - Established        Patient was identified as a fall risk. Risk prevention instructions provided.

## 2024-07-02 NOTE — PATIENT INSTRUCTIONS
Increase amlodipine to 5 mg a day, home blood pressure goal is less than 140/90      Ways to Help Prevent Falls at Home    Quick Tips   ? Ask for help if you need it. Most people want to help!   ? Get up slowly after sitting or laying down   ? Wear a medical alert device or keep cell phone in your pocket   ? Use night lights, especially areas near a bathroom   ? Keep the items you use often within reach on a small stool or end table   ? Use an assistive device such as walker or cane, as directed by provider/physical therapy   ? Use a non-slip mat and grab bars in your bathroom. Look for home health sections for best options     Other Areas to Focus On   ? Exercise and nutrition: Regular exercise or taking a falls prevention class are great ways improve strength and balance. Don’t forget to stay hydrated and bring a snack!   ? Medicine side effects: Some medicines can make you sleepy or dizzy, which could cause a fall. Ask your healthcare provider about the side effects your medicines could cause. Be sure to let them know if you take any vitamins or supplements as well.   ? Tripping hazards: Remove items you could trip on, such as loose mats, rugs, cords, and clutter. Wear closed toe shoes with rubber soles.   ? Health and wellness: Get regular checkups with your healthcare provider, plus routine vision and hearing screenings. Talk with your healthcare provider about:   o Your medicines and the possible side effects - bring them in a bag if that is easier!   o Problems with balance or feeling dizzy   o Ways to promote bone health, such as Vitamin D and calcium supplements   o Questions or concerns about falling     *Ask your healthcare team if you have questions     ©[facility], 2022

## 2024-07-02 NOTE — ASSESSMENT & PLAN NOTE
Blood pressure recheck was still elevated, increase amlodipine from 2-1/2 up to 5 mg once a day, home blood pressure goal to be less than 140/90 consistently.

## 2024-07-12 ENCOUNTER — APPOINTMENT (OUTPATIENT)
Dept: PRIMARY CARE | Facility: CLINIC | Age: 89
End: 2024-07-12
Payer: COMMERCIAL

## 2024-08-04 ENCOUNTER — HOSPITAL ENCOUNTER (EMERGENCY)
Facility: HOSPITAL | Age: 89
Discharge: HOME | End: 2024-08-04
Attending: EMERGENCY MEDICINE
Payer: COMMERCIAL

## 2024-08-04 ENCOUNTER — APPOINTMENT (OUTPATIENT)
Dept: RADIOLOGY | Facility: HOSPITAL | Age: 89
End: 2024-08-04
Payer: COMMERCIAL

## 2024-08-04 VITALS
WEIGHT: 150 LBS | SYSTOLIC BLOOD PRESSURE: 145 MMHG | HEART RATE: 65 BPM | DIASTOLIC BLOOD PRESSURE: 58 MMHG | RESPIRATION RATE: 16 BRPM | BODY MASS INDEX: 26.57 KG/M2 | OXYGEN SATURATION: 95 % | TEMPERATURE: 97.7 F

## 2024-08-04 DIAGNOSIS — M54.12 CERVICAL RADICULOPATHY: Primary | ICD-10-CM

## 2024-08-04 DIAGNOSIS — M47.22 OSTEOARTHRITIS OF SPINE WITH RADICULOPATHY, CERVICAL REGION: ICD-10-CM

## 2024-08-04 PROCEDURE — 2500000004 HC RX 250 GENERAL PHARMACY W/ HCPCS (ALT 636 FOR OP/ED): Performed by: EMERGENCY MEDICINE

## 2024-08-04 PROCEDURE — 72040 X-RAY EXAM NECK SPINE 2-3 VW: CPT | Performed by: RADIOLOGY

## 2024-08-04 PROCEDURE — 72040 X-RAY EXAM NECK SPINE 2-3 VW: CPT

## 2024-08-04 PROCEDURE — 99283 EMERGENCY DEPT VISIT LOW MDM: CPT

## 2024-08-04 PROCEDURE — 96372 THER/PROPH/DIAG INJ SC/IM: CPT | Performed by: EMERGENCY MEDICINE

## 2024-08-04 RX ORDER — PREDNISONE 20 MG/1
20 TABLET ORAL DAILY
Qty: 18 TABLET | Refills: 0 | Status: SHIPPED | OUTPATIENT
Start: 2024-08-04 | End: 2024-08-13

## 2024-08-04 RX ORDER — KETOROLAC TROMETHAMINE 30 MG/ML
15 INJECTION, SOLUTION INTRAMUSCULAR; INTRAVENOUS ONCE
Status: COMPLETED | OUTPATIENT
Start: 2024-08-04 | End: 2024-08-04

## 2024-08-04 ASSESSMENT — PAIN SCALES - GENERAL
PAINLEVEL_OUTOF10: 7
PAINLEVEL_OUTOF10: 8

## 2024-08-04 ASSESSMENT — ENCOUNTER SYMPTOMS
SHORTNESS OF BREATH: 0
PSYCHIATRIC NEGATIVE: 1
ABDOMINAL PAIN: 0
FEVER: 0
NAUSEA: 0
NECK PAIN: 1
SORE THROAT: 0
NUMBNESS: 0
CHILLS: 0
PHOTOPHOBIA: 0
VOMITING: 0
TROUBLE SWALLOWING: 0
WEAKNESS: 0
DIZZINESS: 0
CHEST TIGHTNESS: 0
HEMATOLOGIC/LYMPHATIC NEGATIVE: 1
DYSURIA: 0
NECK STIFFNESS: 1
COUGH: 0
MYALGIAS: 1
PALPITATIONS: 0
ARTHRALGIAS: 1

## 2024-08-04 ASSESSMENT — PAIN DESCRIPTION - LOCATION: LOCATION: NECK

## 2024-08-04 ASSESSMENT — PAIN - FUNCTIONAL ASSESSMENT: PAIN_FUNCTIONAL_ASSESSMENT: 0-10

## 2024-08-04 NOTE — ED PROVIDER NOTES
Chief Complaint: NECK PAIN    This is a 89-year-old female complains of pain in the left side of her neck after shucking corn yesterday.  She knows she has significant arthritis in the lower spine and is seeing pain management for the lower back she denies any paresthesias the pain radiates from the neck to the left shoulder she denies any chest pain no shortness of breath no difficulty breathing no weakness arms or legs and presents now for evaluation she has not taken any medication for it as of yet.           Review of Systems   Constitutional:  Negative for chills and fever.   HENT:  Negative for ear pain, sore throat and trouble swallowing.    Eyes:  Negative for photophobia and visual disturbance.   Respiratory:  Negative for cough, chest tightness and shortness of breath.    Cardiovascular:  Negative for chest pain and palpitations.   Gastrointestinal:  Negative for abdominal pain, nausea and vomiting.   Genitourinary:  Negative for dysuria.   Musculoskeletal:  Positive for arthralgias, myalgias, neck pain and neck stiffness.   Skin:  Negative for rash.   Neurological:  Negative for dizziness, weakness and numbness.   Hematological: Negative.    Psychiatric/Behavioral: Negative.     All other systems reviewed and are negative.       Physical Exam  Constitutional:       General: She is not in acute distress.     Appearance: Normal appearance. She is not ill-appearing or toxic-appearing.   HENT:      Head: Normocephalic and atraumatic.      Right Ear: Tympanic membrane normal.      Left Ear: Tympanic membrane normal.      Nose: Nose normal.      Mouth/Throat:      Mouth: Mucous membranes are dry.      Pharynx: Oropharynx is clear.   Eyes:      Extraocular Movements: Extraocular movements intact.      Conjunctiva/sclera: Conjunctivae normal.      Pupils: Pupils are equal, round, and reactive to light.   Neck:      Thyroid: No thyroid mass.      Trachea: Trachea and phonation normal.      Meningeal: Brudzinski's  sign and Kernig's sign absent.     Cardiovascular:      Rate and Rhythm: Normal rate.      Pulses: Normal pulses.      Heart sounds: No murmur heard.  Pulmonary:      Effort: Pulmonary effort is normal.      Breath sounds: Normal breath sounds.   Abdominal:      Tenderness: There is no abdominal tenderness.   Musculoskeletal:         General: No swelling or tenderness. Normal range of motion.      Cervical back: No edema, erythema or rigidity. Pain with movement, spinous process tenderness and muscular tenderness present.      Right lower leg: No edema.      Left lower leg: No edema.   Lymphadenopathy:      Cervical: No cervical adenopathy.   Skin:     General: Skin is warm.      Capillary Refill: Capillary refill takes less than 2 seconds.      Findings: No erythema or rash.   Neurological:      General: No focal deficit present.      Mental Status: She is alert and oriented to person, place, and time.      Sensory: No sensory deficit.      Motor: No weakness.      Gait: Gait normal.   Psychiatric:         Mood and Affect: Mood normal.         Behavior: Behavior normal.          Labs Reviewed - No data to display     XR cervical spine 2-3 views   Final Result   Multilevel spondylolisthesis and degenerative change of the spine   without osseous injury evident on this two view radiograph series.        MACRO:   None        Signed by: Toan Andrade 8/4/2024 9:48 AM   Dictation workstation:   [id number]           Procedures     Medical Decision Making  [place] diagnosis included torticollis muscle strain cervical radiculopathy DJD spinal stenosis.  Patient received Toradol 30 mG IM as well as x-rays of cervical spine.  X-rays of therapy close spine showed multilevel spondylolisthesis degenerative changes but without any acute fractures.  Patient felt much improved with Toradol that she received here in the emergency department she will be started on prednisone and Tylenol as follow-up with Dr. Sandy in the office for  follow-up         Diagnoses as of 08/04/24 1029   Cervical radiculopathy   Osteoarthritis of spine with radiculopathy, cervical region                    Ming Kruger MD  08/04/24 1030

## 2024-08-06 ENCOUNTER — OFFICE VISIT (OUTPATIENT)
Age: 89
End: 2024-08-06
Payer: COMMERCIAL

## 2024-08-06 VITALS
HEIGHT: 63 IN | HEART RATE: 73 BPM | OXYGEN SATURATION: 98 % | BODY MASS INDEX: 26.65 KG/M2 | WEIGHT: 150.4 LBS | SYSTOLIC BLOOD PRESSURE: 140 MMHG | DIASTOLIC BLOOD PRESSURE: 78 MMHG

## 2024-08-06 DIAGNOSIS — M54.2 NECK PAIN: Primary | ICD-10-CM

## 2024-08-06 ASSESSMENT — ENCOUNTER SYMPTOMS
NECK PAIN: 1
NAUSEA: 0
WEAKNESS: 0
CONSTIPATION: 0
VOMITING: 0
SHORTNESS OF BREATH: 0
ABDOMINAL PAIN: 0
PALPITATIONS: 0
DIARRHEA: 0
APPETITE CHANGE: 0
NECK STIFFNESS: 1
ACTIVITY CHANGE: 0
NUMBNESS: 0
FATIGUE: 0
COUGH: 0
CHEST TIGHTNESS: 0
HEADACHES: 0

## 2024-08-06 NOTE — PROGRESS NOTES
"Subjective   Patient ID: Savannah Yao is a 89 y.o. female who presents for ER F/U Neck Pain.    HPI   Was in ER 8/4 for neck pain, had been shucking corn the day prior, ER gave Prednisone, X-ray with DJD  Pain some better, no APAP needed, no N/T in arms, no weakness, no headache    Review of Systems   Constitutional:  Negative for activity change, appetite change and fatigue.   Respiratory:  Negative for cough, chest tightness and shortness of breath.    Cardiovascular:  Negative for chest pain, palpitations and leg swelling.   Gastrointestinal:  Negative for abdominal pain, constipation, diarrhea, nausea and vomiting.   Musculoskeletal:  Positive for neck pain and neck stiffness.   Neurological:  Negative for weakness, numbness and headaches.       Objective   /78   Pulse 73   Ht 1.6 m (5' 3\")   Wt 68.2 kg (150 lb 6.4 oz)   SpO2 98%   BMI 26.64 kg/m²     Physical Exam  Vitals and nursing note reviewed.   Constitutional:       Appearance: Normal appearance.   HENT:      Head: Normocephalic and atraumatic.      Right Ear: Tympanic membrane, ear canal and external ear normal.      Left Ear: Tympanic membrane, ear canal and external ear normal.      Nose: Nose normal.      Mouth/Throat:      Mouth: Mucous membranes are moist.      Pharynx: Oropharynx is clear.   Cardiovascular:      Rate and Rhythm: Normal rate and regular rhythm.      Pulses: Normal pulses.      Heart sounds: Normal heart sounds.   Pulmonary:      Effort: Pulmonary effort is normal.      Breath sounds: Normal breath sounds.   Musculoskeletal:      Cervical back: Normal range of motion and neck supple.      Comments: Some pain along the base of the neck, normal range of motion of the neck, negative Spurling sign, normal upper extremity strength and reflexes.   Neurological:      Mental Status: She is alert.   Psychiatric:         Mood and Affect: Mood normal.         Behavior: Behavior normal.         Assessment/Plan   Problem List " Items Addressed This Visit    None  Visit Diagnoses         Codes    Neck pain    -  Primary M54.2    Finish prednisone, supplemental Tylenol and stretching, call if any numbness tingling or weakness.

## 2024-08-21 ENCOUNTER — TELEPHONE (OUTPATIENT)
Dept: PAIN MEDICINE | Facility: CLINIC | Age: 89
End: 2024-08-21

## 2024-08-21 ENCOUNTER — HOSPITAL ENCOUNTER (OUTPATIENT)
Dept: RADIOLOGY | Facility: HOSPITAL | Age: 89
Discharge: HOME | End: 2024-08-21
Payer: COMMERCIAL

## 2024-08-21 ENCOUNTER — OFFICE VISIT (OUTPATIENT)
Dept: PAIN MEDICINE | Facility: CLINIC | Age: 89
End: 2024-08-21
Payer: COMMERCIAL

## 2024-08-21 VITALS
HEART RATE: 84 BPM | BODY MASS INDEX: 26.22 KG/M2 | WEIGHT: 148 LBS | SYSTOLIC BLOOD PRESSURE: 155 MMHG | DIASTOLIC BLOOD PRESSURE: 76 MMHG

## 2024-08-21 DIAGNOSIS — M19.90 OSTEOARTHRITIS, UNSPECIFIED OSTEOARTHRITIS TYPE, UNSPECIFIED SITE: ICD-10-CM

## 2024-08-21 DIAGNOSIS — N28.9 RENAL INSUFFICIENCY: ICD-10-CM

## 2024-08-21 DIAGNOSIS — M46.1 SACROILIITIS (CMS-HCC): Primary | ICD-10-CM

## 2024-08-21 DIAGNOSIS — M46.1 SACROILIITIS (CMS-HCC): ICD-10-CM

## 2024-08-21 PROCEDURE — 99214 OFFICE O/P EST MOD 30 MIN: CPT | Performed by: PHYSICIAN ASSISTANT

## 2024-08-21 PROCEDURE — 72170 X-RAY EXAM OF PELVIS: CPT

## 2024-08-21 RX ORDER — ASPIRIN 81 MG/1
81 TABLET ORAL DAILY
COMMUNITY

## 2024-08-21 RX ORDER — SODIUM CHLORIDE, SODIUM LACTATE, POTASSIUM CHLORIDE, CALCIUM CHLORIDE 600; 310; 30; 20 MG/100ML; MG/100ML; MG/100ML; MG/100ML
20 INJECTION, SOLUTION INTRAVENOUS CONTINUOUS
OUTPATIENT
Start: 2024-08-21

## 2024-08-21 RX ORDER — BUPIVACAINE HYDROCHLORIDE 5 MG/ML
2 INJECTION, SOLUTION EPIDURAL; INTRACAUDAL ONCE
OUTPATIENT
Start: 2024-08-21 | End: 2024-08-21

## 2024-08-21 RX ORDER — LIDOCAINE HYDROCHLORIDE 20 MG/ML
6 INJECTION, SOLUTION EPIDURAL; INFILTRATION; INTRACAUDAL; PERINEURAL ONCE
OUTPATIENT
Start: 2024-08-21 | End: 2024-08-21

## 2024-08-21 RX ORDER — METHYLPREDNISOLONE ACETATE 40 MG/ML
40 INJECTION, SUSPENSION INTRA-ARTICULAR; INTRALESIONAL; INTRAMUSCULAR; SOFT TISSUE ONCE
OUTPATIENT
Start: 2024-08-21 | End: 2024-08-21

## 2024-08-21 ASSESSMENT — ENCOUNTER SYMPTOMS
GASTROINTESTINAL NEGATIVE: 1
ALLERGIC/IMMUNOLOGIC NEGATIVE: 1
ENDOCRINE NEGATIVE: 1
PSYCHIATRIC NEGATIVE: 1
HEMATOLOGIC/LYMPHATIC NEGATIVE: 1
BACK PAIN: 1
RESPIRATORY NEGATIVE: 1
ARTHRALGIAS: 1
CARDIOVASCULAR NEGATIVE: 1
NUMBNESS: 1
CONSTITUTIONAL NEGATIVE: 1
MYALGIAS: 1
WEAKNESS: 1
EYES NEGATIVE: 1

## 2024-08-21 ASSESSMENT — PATIENT HEALTH QUESTIONNAIRE - PHQ9
SUM OF ALL RESPONSES TO PHQ9 QUESTIONS 1 AND 2: 0
1. LITTLE INTEREST OR PLEASURE IN DOING THINGS: NOT AT ALL
2. FEELING DOWN, DEPRESSED OR HOPELESS: NOT AT ALL

## 2024-08-21 ASSESSMENT — COLUMBIA-SUICIDE SEVERITY RATING SCALE - C-SSRS
6. HAVE YOU EVER DONE ANYTHING, STARTED TO DO ANYTHING, OR PREPARED TO DO ANYTHING TO END YOUR LIFE?: NO
1. IN THE PAST MONTH, HAVE YOU WISHED YOU WERE DEAD OR WISHED YOU COULD GO TO SLEEP AND NOT WAKE UP?: NO
2. HAVE YOU ACTUALLY HAD ANY THOUGHTS OF KILLING YOURSELF?: NO

## 2024-08-21 NOTE — PROGRESS NOTES
Subjective   Patient ID: Savannah Yao is a 89 y.o. female who presents for Back Pain (PATIENT PRESENTS WITH A DULL PAIN THAT SHE HAD TO THE LEFT LOWER BACK AND BUTTOCK  THAT STARTED A COUPLE OF MONTHS AGO, PAIN DOES NOT GO DOWN HER LEG, SHE HAS BEEN GETTING ADJUSTMENTS WITH THE CHIROPRACTOR WHICH HAS HELPED, BLUE EMU CREAM, ICE/HEAT, HOME STRETCHING EXERCISES DIRECTED BY CHIROPRACTOR, ).WALKING, STAIRS INCREASE THE PAIN, SITTING GIVES HER RELIEF, SHE DOES AMBULATE WITH A WHEELED WALKER, PAIN SCORE 4/10, DEP NO, FALLS NO, SMOKING NO, EVETTE=10%, SOAPP=0    Joselyn Lacie, [facility] 08/21/24 1:52 PM     Patient is a 89-year-old female.  She presents today after a few year hiatus.  She has a history of sacroiliac joint injection done by Dr. Boateng years ago.  This gave her relief.  She is here today with her  who is also our patient.  She has left-sided buttock pain that is a stabbing type discomfort.  It started a few months ago without any incident or trauma.  No radicular symptoms.  She went to the chiropractor.  This has helped.  She is doing a home exercise program.  She is using blue emu cream.  She is using ice and heat with some improvement but unfortunate, she still has pain that she rates a 4/10.  Worse with certain activities.  Worse with standing, worse with walking.  Somewhat better with sitting down.  She states that the conservative treatments that she has done have given her relief but at this time she still having pain that affects her ability to do certain things so she is here today to discuss other options.        Review of Systems   Constitutional: Negative.    HENT: Negative.     Eyes: Negative.    Respiratory: Negative.     Cardiovascular: Negative.    Gastrointestinal: Negative.    Endocrine: Negative.    Genitourinary: Negative.    Musculoskeletal:  Positive for arthralgias, back pain, gait problem and myalgias.   Skin: Negative.    Allergic/Immunologic: Negative.    Neurological:  Positive  for weakness and numbness.   Hematological: Negative.    Psychiatric/Behavioral: Negative.         Objective   Physical Exam  Vitals and nursing note reviewed.   Constitutional:       General: She is not in acute distress.     Appearance: Normal appearance. She is not ill-appearing.   HENT:      Head: Normocephalic and atraumatic.      Right Ear: External ear normal.      Left Ear: External ear normal.      Nose: Nose normal.      Mouth/Throat:      Pharynx: Oropharynx is clear.   Eyes:      Conjunctiva/sclera: Conjunctivae normal.   Cardiovascular:      Rate and Rhythm: Normal rate and regular rhythm.      Pulses: Normal pulses.   Pulmonary:      Effort: Pulmonary effort is normal.      Breath sounds: Normal breath sounds.   Musculoskeletal:         General: Normal range of motion.      Cervical back: Normal range of motion.      Comments: 5/5 strength  No pain with internal or external rotation of the hips  Pain with compression of the left-sided sacroiliac joint  Positive DINORAH test on the left  Positive thigh thrust on the left  Positive Gaenslen's test on the left   Skin:     General: Skin is warm and dry.   Neurological:      General: No focal deficit present.      Mental Status: She is alert and oriented to person, place, and time. Mental status is at baseline.   Psychiatric:         Mood and Affect: Mood normal.         Behavior: Behavior normal.         Thought Content: Thought content normal.         Judgment: Judgment normal.         Assessment/Plan   Diagnoses and all orders for this visit:  Sacroiliitis (CMS-[facility])  -     XR pelvis 1-2 views; Future  -     Sacroiliac Joint Injection; Future  -     FL pain management; Future  Osteoarthritis, unspecified osteoarthritis type, unspecified site  Renal insufficiency  Other orders  -     NPO Diet Except: Sips with meds; Effective now; Standing  -     Height and weight; Standing  -     Insert and maintain peripheral IV; Standing  -     Saline lock IV; Standing  -      POCT Glucose; Standing  -     Type And Screen; Standing  -     Inpatient consult to Respiratory Care; Standing  -     lactated Ringer's infusion  -     Adult diet Regular; Standing  -     Vital Signs; Standing  -     Notify physician - Standard Parameters; Standing  -     Continue IV fluids ordered pre-procedure; Standing  -     Prior to Discharge O2 Weaning; Standing  -     Pulse oximetry, continuous; Standing  -     Discharge patient; Standing  -     lidocaine PF (Xylocaine) 20 mg/mL (2 %) injection 120 mg  -     iohexol (OMNIPaque) 300 mg iodine/mL solution 3 mL  -     bupivacaine PF (Marcaine) 0.5 % (5 mg/mL) injection 10 mg  -     methylPREDNISolone acetate (DEPO-Medrol) injection 40 mg       People walk.  Patient is a 89-year-old female with a past medical history significant for the above-mentioned medical diagnoses.  She is having left-sided buttock pain that affects her ambulatory status.  Affects her quality of life.  Affects her activities and affects her ability to do things she wants to do.  On physical examination it appears to be related to her sacroiliac joint.  She is previously trialed and failed all other reasonable conservative treatments including chiropractic care, home exercise program, blue emu cream and over-the-counter medications.  These give her some relief but not enough.  Based on her pain pattern and her failure to improve with conservative treatments I recommended a pelvis x-ray to assess her anatomy and I recommended a left-sided sacroiliac joint injection under fluoroscopy for both diagnostic and therapeutic purposes.  Procedure was discussed.  Risk and benefits were discussed.  Patient is agreeable.  She will follow-up 2 weeks after the injection for reevaluation.  Call clinic sooner if necessary.

## 2024-08-21 NOTE — H&P (VIEW-ONLY)
Subjective   Patient ID: Savannah Yao is a 89 y.o. female who presents for Back Pain (PATIENT PRESENTS WITH A DULL PAIN THAT SHE HAD TO THE LEFT LOWER BACK AND BUTTOCK  THAT STARTED A COUPLE OF MONTHS AGO, PAIN DOES NOT GO DOWN HER LEG, SHE HAS BEEN GETTING ADJUSTMENTS WITH THE CHIROPRACTOR WHICH HAS HELPED, BLUE EMU CREAM, ICE/HEAT, HOME STRETCHING EXERCISES DIRECTED BY CHIROPRACTOR, ).WALKING, STAIRS INCREASE THE PAIN, SITTING GIVES HER RELIEF, SHE DOES AMBULATE WITH A WHEELED WALKER, PAIN SCORE 4/10, DEP NO, FALLS NO, SMOKING NO, EVETTE=10%, SOAPP=0    Joselyn Lacie, [facility] 08/21/24 1:52 PM     Patient is a 89-year-old female.  She presents today after a few year hiatus.  She has a history of sacroiliac joint injection done by Dr. Boateng years ago.  This gave her relief.  She is here today with her  who is also our patient.  She has left-sided buttock pain that is a stabbing type discomfort.  It started a few months ago without any incident or trauma.  No radicular symptoms.  She went to the chiropractor.  This has helped.  She is doing a home exercise program.  She is using blue emu cream.  She is using ice and heat with some improvement but unfortunate, she still has pain that she rates a 4/10.  Worse with certain activities.  Worse with standing, worse with walking.  Somewhat better with sitting down.  She states that the conservative treatments that she has done have given her relief but at this time she still having pain that affects her ability to do certain things so she is here today to discuss other options.        Review of Systems   Constitutional: Negative.    HENT: Negative.     Eyes: Negative.    Respiratory: Negative.     Cardiovascular: Negative.    Gastrointestinal: Negative.    Endocrine: Negative.    Genitourinary: Negative.    Musculoskeletal:  Positive for arthralgias, back pain, gait problem and myalgias.   Skin: Negative.    Allergic/Immunologic: Negative.    Neurological:  Positive  for weakness and numbness.   Hematological: Negative.    Psychiatric/Behavioral: Negative.         Objective   Physical Exam  Vitals and nursing note reviewed.   Constitutional:       General: She is not in acute distress.     Appearance: Normal appearance. She is not ill-appearing.   HENT:      Head: Normocephalic and atraumatic.      Right Ear: External ear normal.      Left Ear: External ear normal.      Nose: Nose normal.      Mouth/Throat:      Pharynx: Oropharynx is clear.   Eyes:      Conjunctiva/sclera: Conjunctivae normal.   Cardiovascular:      Rate and Rhythm: Normal rate and regular rhythm.      Pulses: Normal pulses.   Pulmonary:      Effort: Pulmonary effort is normal.      Breath sounds: Normal breath sounds.   Musculoskeletal:         General: Normal range of motion.      Cervical back: Normal range of motion.      Comments: 5/5 strength  No pain with internal or external rotation of the hips  Pain with compression of the left-sided sacroiliac joint  Positive DINORAH test on the left  Positive thigh thrust on the left  Positive Gaenslen's test on the left   Skin:     General: Skin is warm and dry.   Neurological:      General: No focal deficit present.      Mental Status: She is alert and oriented to person, place, and time. Mental status is at baseline.   Psychiatric:         Mood and Affect: Mood normal.         Behavior: Behavior normal.         Thought Content: Thought content normal.         Judgment: Judgment normal.         Assessment/Plan   Diagnoses and all orders for this visit:  Sacroiliitis (CMS-formerly [facility])  -     XR pelvis 1-2 views; Future  -     Sacroiliac Joint Injection; Future  -     FL pain management; Future  Osteoarthritis, unspecified osteoarthritis type, unspecified site  Renal insufficiency  Other orders  -     NPO Diet Except: Sips with meds; Effective now; Standing  -     Height and weight; Standing  -     Insert and maintain peripheral IV; Standing  -     Saline lock IV; Standing  -      POCT Glucose; Standing  -     Type And Screen; Standing  -     Inpatient consult to Respiratory Care; Standing  -     lactated Ringer's infusion  -     Adult diet Regular; Standing  -     Vital Signs; Standing  -     Notify physician - Standard Parameters; Standing  -     Continue IV fluids ordered pre-procedure; Standing  -     Prior to Discharge O2 Weaning; Standing  -     Pulse oximetry, continuous; Standing  -     Discharge patient; Standing  -     lidocaine PF (Xylocaine) 20 mg/mL (2 %) injection 120 mg  -     iohexol (OMNIPaque) 300 mg iodine/mL solution 3 mL  -     bupivacaine PF (Marcaine) 0.5 % (5 mg/mL) injection 10 mg  -     methylPREDNISolone acetate (DEPO-Medrol) injection 40 mg       People walk.  Patient is a 89-year-old female with a past medical history significant for the above-mentioned medical diagnoses.  She is having left-sided buttock pain that affects her ambulatory status.  Affects her quality of life.  Affects her activities and affects her ability to do things she wants to do.  On physical examination it appears to be related to her sacroiliac joint.  She is previously trialed and failed all other reasonable conservative treatments including chiropractic care, home exercise program, blue emu cream and over-the-counter medications.  These give her some relief but not enough.  Based on her pain pattern and her failure to improve with conservative treatments I recommended a pelvis x-ray to assess her anatomy and I recommended a left-sided sacroiliac joint injection under fluoroscopy for both diagnostic and therapeutic purposes.  Procedure was discussed.  Risk and benefits were discussed.  Patient is agreeable.  She will follow-up 2 weeks after the injection for reevaluation.  Call clinic sooner if necessary.

## 2024-08-30 ENCOUNTER — HOSPITAL ENCOUNTER (OUTPATIENT)
Dept: OPERATING ROOM | Facility: HOSPITAL | Age: 89
Setting detail: OUTPATIENT SURGERY
Discharge: HOME | End: 2024-08-30
Payer: COMMERCIAL

## 2024-08-30 VITALS
OXYGEN SATURATION: 93 % | HEIGHT: 63 IN | SYSTOLIC BLOOD PRESSURE: 152 MMHG | HEART RATE: 73 BPM | WEIGHT: 148 LBS | DIASTOLIC BLOOD PRESSURE: 68 MMHG | TEMPERATURE: 98.9 F | RESPIRATION RATE: 20 BRPM | BODY MASS INDEX: 26.22 KG/M2

## 2024-08-30 DIAGNOSIS — M46.1 SACROILIITIS (CMS-HCC): ICD-10-CM

## 2024-08-30 PROCEDURE — 2500000004 HC RX 250 GENERAL PHARMACY W/ HCPCS (ALT 636 FOR OP/ED): Performed by: STUDENT IN AN ORGANIZED HEALTH CARE EDUCATION/TRAINING PROGRAM

## 2024-08-30 PROCEDURE — 27096 INJECT SACROILIAC JOINT: CPT | Mod: LT | Performed by: STUDENT IN AN ORGANIZED HEALTH CARE EDUCATION/TRAINING PROGRAM

## 2024-08-30 PROCEDURE — 2550000001 HC RX 255 CONTRASTS: Performed by: PHYSICIAN ASSISTANT

## 2024-08-30 PROCEDURE — 2500000005 HC RX 250 GENERAL PHARMACY W/O HCPCS: Performed by: PHYSICIAN ASSISTANT

## 2024-08-30 RX ORDER — METHYLPREDNISOLONE ACETATE 40 MG/ML
INJECTION, SUSPENSION INTRA-ARTICULAR; INTRALESIONAL; INTRAMUSCULAR; SOFT TISSUE AS NEEDED
Status: COMPLETED | OUTPATIENT
Start: 2024-08-30 | End: 2024-08-30

## 2024-08-30 RX ORDER — METHYLPREDNISOLONE ACETATE 40 MG/ML
40 INJECTION, SUSPENSION INTRA-ARTICULAR; INTRALESIONAL; INTRAMUSCULAR; SOFT TISSUE ONCE
Status: DISCONTINUED | OUTPATIENT
Start: 2024-08-30 | End: 2024-08-31 | Stop reason: HOSPADM

## 2024-08-30 RX ORDER — LIDOCAINE HYDROCHLORIDE 20 MG/ML
6 INJECTION, SOLUTION EPIDURAL; INFILTRATION; INTRACAUDAL; PERINEURAL ONCE
Status: COMPLETED | OUTPATIENT
Start: 2024-08-30 | End: 2024-08-30

## 2024-08-30 RX ORDER — BUPIVACAINE HYDROCHLORIDE 5 MG/ML
INJECTION, SOLUTION EPIDURAL; INTRACAUDAL AS NEEDED
Status: COMPLETED | OUTPATIENT
Start: 2024-08-30 | End: 2024-08-30

## 2024-08-30 RX ORDER — BUPIVACAINE HYDROCHLORIDE 5 MG/ML
2 INJECTION, SOLUTION EPIDURAL; INTRACAUDAL ONCE
Status: DISCONTINUED | OUTPATIENT
Start: 2024-08-30 | End: 2024-08-31 | Stop reason: HOSPADM

## 2024-08-30 ASSESSMENT — PAIN - FUNCTIONAL ASSESSMENT
PAIN_FUNCTIONAL_ASSESSMENT: 0-10
PAIN_FUNCTIONAL_ASSESSMENT: 0-10

## 2024-08-30 ASSESSMENT — PAIN DESCRIPTION - DESCRIPTORS: DESCRIPTORS: ACHING

## 2024-08-30 ASSESSMENT — ENCOUNTER SYMPTOMS
LOSS OF SENSATION IN FEET: 0
OCCASIONAL FEELINGS OF UNSTEADINESS: 1
DEPRESSION: 0

## 2024-08-30 ASSESSMENT — PAIN SCALES - GENERAL
PAINLEVEL_OUTOF10: 3
PAINLEVEL_OUTOF10: 0 - NO PAIN

## 2024-08-30 NOTE — PERIOPERATIVE NURSING NOTE
Discharge instructions reviewed by Vivian SHIN RN  no questions and verbalized understanding.   discharged amb to exit steady gait,  to be driven home by family yosef well

## 2024-09-24 ENCOUNTER — OFFICE VISIT (OUTPATIENT)
Dept: PAIN MEDICINE | Facility: CLINIC | Age: 89
End: 2024-09-24
Payer: COMMERCIAL

## 2024-09-24 VITALS
WEIGHT: 151 LBS | RESPIRATION RATE: 16 BRPM | BODY MASS INDEX: 26.75 KG/M2 | HEART RATE: 73 BPM | DIASTOLIC BLOOD PRESSURE: 62 MMHG | SYSTOLIC BLOOD PRESSURE: 153 MMHG

## 2024-09-24 DIAGNOSIS — M46.1 SACROILIITIS (CMS-HCC): Primary | ICD-10-CM

## 2024-09-24 PROCEDURE — 99213 OFFICE O/P EST LOW 20 MIN: CPT | Performed by: PHYSICIAN ASSISTANT

## 2024-09-24 ASSESSMENT — ENCOUNTER SYMPTOMS
MYALGIAS: 0
ENDOCRINE NEGATIVE: 1
ALLERGIC/IMMUNOLOGIC NEGATIVE: 1
PSYCHIATRIC NEGATIVE: 1
ARTHRALGIAS: 0
WEAKNESS: 0
NUMBNESS: 0
CARDIOVASCULAR NEGATIVE: 1
CONSTITUTIONAL NEGATIVE: 1
EYES NEGATIVE: 1
RESPIRATORY NEGATIVE: 1
GASTROINTESTINAL NEGATIVE: 1
HEMATOLOGIC/LYMPHATIC NEGATIVE: 1

## 2024-09-24 ASSESSMENT — COLUMBIA-SUICIDE SEVERITY RATING SCALE - C-SSRS
2. HAVE YOU ACTUALLY HAD ANY THOUGHTS OF KILLING YOURSELF?: NO
6. HAVE YOU EVER DONE ANYTHING, STARTED TO DO ANYTHING, OR PREPARED TO DO ANYTHING TO END YOUR LIFE?: NO
1. IN THE PAST MONTH, HAVE YOU WISHED YOU WERE DEAD OR WISHED YOU COULD GO TO SLEEP AND NOT WAKE UP?: NO

## 2024-09-24 NOTE — PROGRESS NOTES
Subjective   Patient ID: Savannah Yao is a 89 y.o. female who presents for Pain (FUV for Left SIJ 8-30-24 for L hip pain. Patient states she got a lot of relief. Pain score 0/10 today. She has only occasional pain since injection. Tylenol has been affective for pain relief.  EVETTE = 4/100. Patient is ambulating with a walker. ).    Evy Kemp RN 09/24/24 1:07 PM     Patient is a 89-year-old female following up today after undergoing a left-sided sacroiliac joint injection.  This was done on 8/30/2024.  At this time, she has obtained 100% relief.  She is feeling well and doing well.  She is comfortable and happy.  Does not have any significant pain or complaints.  She states that she can do the things she wants to do and has very occasional pain that Tylenol helps relief.  She is pleased with how she is feeling and how she is doing and she is getting back to normal activities.        Review of Systems   Constitutional: Negative.    HENT: Negative.     Eyes: Negative.    Respiratory: Negative.     Cardiovascular: Negative.    Gastrointestinal: Negative.    Endocrine: Negative.    Genitourinary: Negative.    Musculoskeletal:  Negative for arthralgias and myalgias.   Skin: Negative.    Allergic/Immunologic: Negative.    Neurological:  Negative for weakness and numbness.   Hematological: Negative.    Psychiatric/Behavioral: Negative.         Objective   Physical Exam  Vitals and nursing note reviewed.   Constitutional:       General: She is not in acute distress.     Appearance: Normal appearance. She is not ill-appearing.   HENT:      Head: Normocephalic and atraumatic.      Right Ear: External ear normal.      Left Ear: External ear normal.      Nose: Nose normal.      Mouth/Throat:      Pharynx: Oropharynx is clear.   Eyes:      Conjunctiva/sclera: Conjunctivae normal.   Cardiovascular:      Rate and Rhythm: Normal rate and regular rhythm.      Pulses: Normal pulses.   Pulmonary:      Effort: Pulmonary  effort is normal.      Breath sounds: Normal breath sounds.   Musculoskeletal:         General: Normal range of motion.      Cervical back: Normal range of motion.      Comments: 5/5 strength  Ambulating with a walker   Skin:     General: Skin is warm and dry.   Neurological:      General: No focal deficit present.      Mental Status: She is alert and oriented to person, place, and time. Mental status is at baseline.   Psychiatric:         Mood and Affect: Mood normal.         Behavior: Behavior normal.         Thought Content: Thought content normal.         Judgment: Judgment normal.         Assessment/Plan   Diagnoses and all orders for this visit:  Sacroiliitis (CMS-[facility])       Patient is a 89-year-old female who underwent recent left-sided sacroiliac joint injection.  This was done on 8/30/2024 and at this time has given her significant relief.  She is feeling well and doing well.  She is comfortable and happy.  She does not have any significant pain or complaints.  She does not have any significant concerns.  At this time she is pleased with how she is feeling and how she is doing and she wants to let us know if she requires another injection.  She did not want to make another follow-up at this time.  She will just call us when she needs this.  Repeating the injection every 3 months as needed was discussed and she will call us in the future if she requires anything from our services.

## 2024-11-04 ENCOUNTER — OFFICE VISIT (OUTPATIENT)
Dept: PAIN MEDICINE | Facility: CLINIC | Age: 89
End: 2024-11-04
Payer: COMMERCIAL

## 2024-11-04 VITALS
HEART RATE: 69 BPM | SYSTOLIC BLOOD PRESSURE: 164 MMHG | DIASTOLIC BLOOD PRESSURE: 60 MMHG | WEIGHT: 153 LBS | BODY MASS INDEX: 27.1 KG/M2 | RESPIRATION RATE: 16 BRPM

## 2024-11-04 DIAGNOSIS — M51.26 LUMBAR DISC HERNIATION: ICD-10-CM

## 2024-11-04 DIAGNOSIS — M54.17 LUMBOSACRAL RADICULOPATHY: ICD-10-CM

## 2024-11-04 DIAGNOSIS — M46.1 SACROILIITIS (CMS-HCC): Primary | ICD-10-CM

## 2024-11-04 PROCEDURE — 99213 OFFICE O/P EST LOW 20 MIN: CPT

## 2024-11-04 RX ORDER — BUPIVACAINE HYDROCHLORIDE 5 MG/ML
2 INJECTION, SOLUTION EPIDURAL; INTRACAUDAL ONCE
OUTPATIENT
Start: 2024-11-04 | End: 2024-11-04

## 2024-11-04 RX ORDER — METHYLPREDNISOLONE ACETATE 40 MG/ML
40 INJECTION, SUSPENSION INTRA-ARTICULAR; INTRALESIONAL; INTRAMUSCULAR; SOFT TISSUE ONCE
OUTPATIENT
Start: 2024-11-04 | End: 2024-11-04

## 2024-11-04 RX ORDER — LIDOCAINE HYDROCHLORIDE 20 MG/ML
6 INJECTION, SOLUTION EPIDURAL; INFILTRATION; INTRACAUDAL; PERINEURAL ONCE
OUTPATIENT
Start: 2024-11-04 | End: 2024-11-04

## 2024-11-04 ASSESSMENT — ENCOUNTER SYMPTOMS
DYSPHORIC MOOD: 0
COUGH: 0
CONSTITUTIONAL NEGATIVE: 1
FACIAL ASYMMETRY: 0
BACK PAIN: 1
EYES NEGATIVE: 1
BRUISES/BLEEDS EASILY: 0
ENDOCRINE NEGATIVE: 1
LIGHT-HEADEDNESS: 0
ARTHRALGIAS: 0
WHEEZING: 0
ADENOPATHY: 0
SHORTNESS OF BREATH: 0
ALLERGIC/IMMUNOLOGIC NEGATIVE: 1
WEAKNESS: 0
MYALGIAS: 1
PALPITATIONS: 0

## 2024-11-04 NOTE — PROGRESS NOTES
Subjective   Patient ID: Savannah Yao is a 89 y.o. female who presents for Follow-up (FUV pt requested her pain is starting to come back. . Today she reports having Lt side lower back rates 2/10 now and 4-5/10 at worst, describes as aching, increases with or with out activity.  She is having chiropractic adjustments, heat/ice, blue emu cream these help her pain a some. ) Her last SIJ injection was 8/30/24.   EVETTE score 4%.   Pauly Parisi RN 11/04/24 9:19 AM     The patient is a 89-year-old female who presents today for a follow-up appointment.  She currently rates her pain a 2/10, but says it can get up to a 5/10 at its worst in the left side of her lower back.  It can interfere with her ability to ambulate and perform ADLs around the house and her ability to function.  She last had a left SI joint injection on 8/30/2024 with significant relief of greater than 80%.  She says while it is still effective, it is beginning to wear off, and is interested in repeating it before the pain becomes unbearable.  We discussed that 11/30/2024 would be at the 3-month mahnaz, and it would likely have to be after this date that we could repeat it, the patient is agreeable.        Review of Systems   Constitutional: Negative.    HENT: Negative.     Eyes: Negative.    Respiratory:  Negative for cough, shortness of breath and wheezing.    Cardiovascular:  Negative for chest pain, palpitations and leg swelling.   Endocrine: Negative.    Genitourinary: Negative.    Musculoskeletal:  Positive for back pain and myalgias. Negative for arthralgias.   Skin: Negative.    Allergic/Immunologic: Negative.    Neurological:  Negative for facial asymmetry, weakness and light-headedness.   Hematological:  Negative for adenopathy. Does not bruise/bleed easily.   Psychiatric/Behavioral:  Negative for dysphoric mood and suicidal ideas.        Objective   Physical Exam  Constitutional:       General: She is not in acute distress.     Appearance:  Normal appearance.   HENT:      Head: Normocephalic.      Mouth/Throat:      Mouth: Mucous membranes are moist.   Eyes:      Extraocular Movements: Extraocular movements intact.   Cardiovascular:      Rate and Rhythm: Normal rate and regular rhythm.      Pulses: Normal pulses.      Heart sounds: Normal heart sounds. No murmur heard.     No friction rub. No gallop.   Pulmonary:      Effort: Pulmonary effort is normal.      Breath sounds: Normal breath sounds. No wheezing, rhonchi or rales.   Abdominal:      General: Abdomen is flat.      Palpations: Abdomen is soft.   Musculoskeletal:      Cervical back: Normal range of motion.      Right lower leg: No edema.      Left lower leg: No edema.      Comments: Ambulates with a walker  Strength 5/5 BLE  No lumbar paraspinal tenderness to palpation  Opncho finger positive left, negative right  DINORAH positive left, negative right  Distraction positive left, negative right  Compression positive left, negative right   Lymphadenopathy:      Cervical: No cervical adenopathy.   Skin:     General: Skin is warm and dry.   Neurological:      General: No focal deficit present.      Mental Status: She is alert and oriented to person, place, and time. Mental status is at baseline.   Psychiatric:         Mood and Affect: Mood normal.         Behavior: Behavior normal.         Assessment/Plan   Diagnoses and all orders for this visit:  Sacroiliitis (CMS-[facility])  -     FL pain management; Future  -     Sacroiliac Joint Injection; Future  Lumbar disc herniation  Lumbosacral radiculopathy  Other orders  -     lidocaine PF (Xylocaine) 20 mg/mL (2 %) injection 120 mg  -     iohexol (OMNIPaque) 300 mg iodine/mL solution 3 mL  -     bupivacaine PF 0.5 % (Marcaine) 0.5 % (5 mg/mL) injection 10 mg  -     methylPREDNISolone acetate (DEPO-Medrol) injection 40 mg  -     NPO Diet Except: Sips with meds; Effective now; Standing  -     Height and weight; Standing  -     POCT Glucose; Standing  -     Type  And Screen; Standing  -     Inpatient consult to Respiratory Care; Standing  -     Adult diet Regular; Standing  -     Vital Signs; Standing  -     Notify physician - Standard Parameters; Standing  -     Continue IV fluids ordered pre-procedure; Standing  -     Prior to Discharge O2 Weaning; Standing  -     Pulse oximetry, continuous; Standing  -     Discharge patient; Standing       The patient is a 89-year-old female with a past medical history significant for the above-mentioned problems.  She currently rates her pain a 2/10, but says it does get up to a 5/10 at its worst in the left side of her lower back.  The pain is worse with ambulation and climbing in and out of the car.  The pain interferes with her ability to function at home and perform her ADLs.  She last had a left SI joint injection on 8/30/2024 with significant relief.  She says it is only started to wear off.  She feels as if it is still somewhat effective, but with the holidays coming up she is interested in repeating this before the pain would become unbearable.  Given that she has previously tried and failed all reasonable conservative measures including medications and physical therapy, as well as chiropractic care, I feel it is reasonable to repeat the left sacroiliac joint injection under fluoroscopy.  The procedure was discussed, risks and benefits were discussed, patient is agreeable.  She will follow-up after the injection for reevaluation, call the clinic sooner if needed.

## 2024-11-12 ENCOUNTER — TELEPHONE (OUTPATIENT)
Dept: PAIN MEDICINE | Facility: CLINIC | Age: 89
End: 2024-11-12
Payer: COMMERCIAL

## 2024-12-06 ENCOUNTER — HOSPITAL ENCOUNTER (OUTPATIENT)
Dept: OPERATING ROOM | Facility: HOSPITAL | Age: 89
Setting detail: OUTPATIENT SURGERY
Discharge: HOME | End: 2024-12-06
Payer: COMMERCIAL

## 2024-12-06 VITALS
WEIGHT: 150 LBS | HEART RATE: 71 BPM | DIASTOLIC BLOOD PRESSURE: 78 MMHG | HEIGHT: 63 IN | TEMPERATURE: 98.6 F | BODY MASS INDEX: 26.58 KG/M2 | OXYGEN SATURATION: 96 % | SYSTOLIC BLOOD PRESSURE: 149 MMHG | RESPIRATION RATE: 16 BRPM

## 2024-12-06 DIAGNOSIS — M46.1 SACROILIITIS (CMS-HCC): ICD-10-CM

## 2024-12-06 PROCEDURE — 2550000001 HC RX 255 CONTRASTS: Performed by: STUDENT IN AN ORGANIZED HEALTH CARE EDUCATION/TRAINING PROGRAM

## 2024-12-06 PROCEDURE — 2500000004 HC RX 250 GENERAL PHARMACY W/ HCPCS (ALT 636 FOR OP/ED): Performed by: STUDENT IN AN ORGANIZED HEALTH CARE EDUCATION/TRAINING PROGRAM

## 2024-12-06 PROCEDURE — 27096 INJECT SACROILIAC JOINT: CPT | Mod: LT | Performed by: STUDENT IN AN ORGANIZED HEALTH CARE EDUCATION/TRAINING PROGRAM

## 2024-12-06 RX ORDER — LIDOCAINE HYDROCHLORIDE 20 MG/ML
INJECTION, SOLUTION EPIDURAL; INFILTRATION; INTRACAUDAL; PERINEURAL AS NEEDED
Status: COMPLETED | OUTPATIENT
Start: 2024-12-06 | End: 2024-12-06

## 2024-12-06 RX ORDER — METHYLPREDNISOLONE ACETATE 40 MG/ML
INJECTION, SUSPENSION INTRA-ARTICULAR; INTRALESIONAL; INTRAMUSCULAR; SOFT TISSUE AS NEEDED
Status: COMPLETED | OUTPATIENT
Start: 2024-12-06 | End: 2024-12-06

## 2024-12-06 RX ORDER — BUPIVACAINE HYDROCHLORIDE 5 MG/ML
INJECTION, SOLUTION EPIDURAL; INTRACAUDAL AS NEEDED
Status: COMPLETED | OUTPATIENT
Start: 2024-12-06 | End: 2024-12-06

## 2024-12-06 ASSESSMENT — ENCOUNTER SYMPTOMS
OCCASIONAL FEELINGS OF UNSTEADINESS: 1
LOSS OF SENSATION IN FEET: 0
DEPRESSION: 0

## 2024-12-06 ASSESSMENT — COLUMBIA-SUICIDE SEVERITY RATING SCALE - C-SSRS
1. IN THE PAST MONTH, HAVE YOU WISHED YOU WERE DEAD OR WISHED YOU COULD GO TO SLEEP AND NOT WAKE UP?: NO
2. HAVE YOU ACTUALLY HAD ANY THOUGHTS OF KILLING YOURSELF?: NO
6. HAVE YOU EVER DONE ANYTHING, STARTED TO DO ANYTHING, OR PREPARED TO DO ANYTHING TO END YOUR LIFE?: NO

## 2024-12-06 ASSESSMENT — PAIN SCALES - GENERAL
PAINLEVEL_OUTOF10: 4
PAINLEVEL_OUTOF10: 0 - NO PAIN

## 2024-12-06 ASSESSMENT — PAIN DESCRIPTION - DESCRIPTORS: DESCRIPTORS: ACHING

## 2024-12-06 ASSESSMENT — PAIN - FUNCTIONAL ASSESSMENT
PAIN_FUNCTIONAL_ASSESSMENT: 0-10
PAIN_FUNCTIONAL_ASSESSMENT: 0-10

## 2024-12-06 NOTE — H&P
History Of Present Illness  Savannah Yao is a 89 y.o. female presenting with oain.     Past Medical History  Past Medical History:   Diagnosis Date    Anemia     Khan's esophagus     CKD (chronic kidney disease)     DJD (degenerative joint disease)     DM (diabetes mellitus) (Multi)     GERD (gastroesophageal reflux disease)     Hiatal hernia     HLD (hyperlipidemia)     Hypertension     Nephrolithiasis     Personal history of other diseases of the musculoskeletal system and connective tissue 06/21/2021    History of inflammation of sacroiliac joint    Vocal cord paralysis     Left       Surgical History  Past Surgical History:   Procedure Laterality Date    CAROTID ENDARTERECTOMY Right     CARPAL TUNNEL RELEASE      ESOPHAGOGASTRODUODENOSCOPY      HIP FRACTURE SURGERY Right 05/05/2023    OTHER SURGICAL HISTORY  05/28/2021    Intra-articular corticosteroid injection    OTHER SURGICAL HISTORY  01/29/2021    Epidural steroid injection    SACROILIAC JOINT INJECTION Left 08/30/2024    Lt SIJ    THORACIC DISCECTOMY      TOTAL KNEE ARTHROPLASTY          Social History  She reports that she has never smoked. She has never been exposed to tobacco smoke. She has never used smokeless tobacco. She reports that she does not drink alcohol and does not use drugs.    Family History  Family History   Problem Relation Name Age of Onset    Cancer Mother      No Known Problems Father      Other (CVA) Sister      Hypertension Sister      Breast cancer Sister      Hypertension Brother          Allergies  Aspirin, Diclofenac sodium, and Tramadol    Review of Systems   All other systems reviewed and are negative.       Physical Exam     Last Recorded Vitals  There were no vitals taken for this visit.    Relevant Results        Constitutional: No acute distress, well appearing and well nourished. Patient appears stated age.  Eyes:  nonicteric sclerae   ENT: Hearing is grossly intact.  Neck: trachea midline  Head and Face: grossly  normal.  Respiratory: nonlabored breathing  Cardiovascular: rate per vitals.  Neuro: alert, moving extremities.       Assessment/Plan   Assessment & Plan  Sacroiliitis (CMS-[facility])      korin MORILLO spent   minutes in the professional and overall care of this patient.      Donald Mckinney, DO

## 2024-12-27 ENCOUNTER — HOSPITAL ENCOUNTER (EMERGENCY)
Facility: HOSPITAL | Age: 89
Discharge: HOME | End: 2024-12-27
Attending: EMERGENCY MEDICINE
Payer: COMMERCIAL

## 2024-12-27 ENCOUNTER — APPOINTMENT (OUTPATIENT)
Dept: RADIOLOGY | Facility: HOSPITAL | Age: 89
End: 2024-12-27
Payer: COMMERCIAL

## 2024-12-27 VITALS
HEIGHT: 63 IN | BODY MASS INDEX: 26.22 KG/M2 | TEMPERATURE: 97.8 F | OXYGEN SATURATION: 94 % | HEART RATE: 66 BPM | DIASTOLIC BLOOD PRESSURE: 53 MMHG | SYSTOLIC BLOOD PRESSURE: 119 MMHG | RESPIRATION RATE: 18 BRPM | WEIGHT: 148 LBS

## 2024-12-27 DIAGNOSIS — S76.212A STRAIN OF LEFT GROIN: Primary | ICD-10-CM

## 2024-12-27 PROCEDURE — 2500000001 HC RX 250 WO HCPCS SELF ADMINISTERED DRUGS (ALT 637 FOR MEDICARE OP): Performed by: EMERGENCY MEDICINE

## 2024-12-27 PROCEDURE — 73502 X-RAY EXAM HIP UNI 2-3 VIEWS: CPT | Mod: LT

## 2024-12-27 PROCEDURE — 73502 X-RAY EXAM HIP UNI 2-3 VIEWS: CPT | Mod: LEFT SIDE | Performed by: RADIOLOGY

## 2024-12-27 PROCEDURE — 99283 EMERGENCY DEPT VISIT LOW MDM: CPT | Performed by: EMERGENCY MEDICINE

## 2024-12-27 RX ORDER — ACETAMINOPHEN 325 MG/1
975 TABLET ORAL ONCE
Status: COMPLETED | OUTPATIENT
Start: 2024-12-27 | End: 2024-12-27

## 2024-12-27 RX ORDER — LIDOCAINE 50 MG/G
1 PATCH TOPICAL DAILY
Qty: 7 PATCH | Refills: 0 | Status: SHIPPED | OUTPATIENT
Start: 2024-12-27

## 2024-12-27 RX ADMIN — ACETAMINOPHEN 975 MG: 325 TABLET ORAL at 08:56

## 2024-12-27 ASSESSMENT — PAIN - FUNCTIONAL ASSESSMENT
PAIN_FUNCTIONAL_ASSESSMENT: 0-10
PAIN_FUNCTIONAL_ASSESSMENT: 0-10

## 2024-12-27 ASSESSMENT — PAIN SCALES - GENERAL
PAINLEVEL_OUTOF10: 0 - NO PAIN
PAINLEVEL_OUTOF10: 1
PAINLEVEL_OUTOF10: 0 - NO PAIN

## 2024-12-27 ASSESSMENT — PAIN DESCRIPTION - ORIENTATION: ORIENTATION: LEFT

## 2024-12-27 ASSESSMENT — PAIN DESCRIPTION - LOCATION: LOCATION: GROIN

## 2024-12-27 NOTE — ED PROVIDER NOTES
HPI   Chief Complaint   Patient presents with    Leg Pain     Left leg/groin pain x 1 week 1 time. Pt felt pain in her left groin while getting into a car. Pain resolved. 2 nights ago it hurt getting into bed. Pt reports its not getting better now.       Limitations to History: None    HPI: 89-year-old female presents with left groin pain.  States she is getting in the car approximately 1 week ago and felt pain in her left groin.  States that she was feeling well following that until 2 nights ago when she to bed and had a similar pain.  States it is worse when she raises her leg.  Denies any changes in urinary or bowel habits.  Denies any fall or trauma.    Additional History Obtained from:  at the bedside.    ------------------------------------------------------------------------------------------------------------------------------------------  Physical Exam:    VS: As documented in the triage note and EMR flowsheet from this visit were reviewed.    Appearance: Alert. cooperative,  in no acute distress.   Skin: Intact,  dry skin, no lesions, rash, petechiae or purpura.    HENT: Normocephalic, atraumatic. Nares patent. No intraoral lesions.   Neck: Supple, without meningismus. Trachea at midline. No lymphadenopathy.  Pulmonary: Clear bilaterally with good chest wall excursion. No rales, rhonchi or wheezing. No accessory muscle use or stridor.  Cardiac: Regular rate and rhythm, no rubs, murmurs, or gallops.   Abdomen: Abdomen is soft, nontender, and nondistended.  No palpable organomegaly.  No rebound or guarding.    Musculoskeletal: Full range of motion.  Pulses full and equal. No cyanosis, clubbing, or edema.  Tenderness palpation of the left groin without evidence of hernia.  Neurological: Sensation grossly intact to the bilateral lower extremities.  Psychiatric: Appropriate mood and affect.                Patient History   Past Medical History:   Diagnosis Date    Anemia     Khan's esophagus     CKD  (chronic kidney disease)     DJD (degenerative joint disease)     DM (diabetes mellitus) (Multi)     GERD (gastroesophageal reflux disease)     Hiatal hernia     HLD (hyperlipidemia)     Hypertension     Nephrolithiasis     Personal history of other diseases of the musculoskeletal system and connective tissue 06/21/2021    History of inflammation of sacroiliac joint    Vocal cord paralysis     Left     Past Surgical History:   Procedure Laterality Date    CAROTID ENDARTERECTOMY Right     CARPAL TUNNEL RELEASE      ESOPHAGOGASTRODUODENOSCOPY      HIP FRACTURE SURGERY Right 05/05/2023    INJECTION Left 12/06/2024    SIJ injection    OTHER SURGICAL HISTORY  05/28/2021    Intra-articular corticosteroid injection    OTHER SURGICAL HISTORY  01/29/2021    Epidural steroid injection    SACROILIAC JOINT INJECTION Left 08/30/2024    Lt SIJ    THORACIC DISCECTOMY      TOTAL KNEE ARTHROPLASTY       Family History   Problem Relation Name Age of Onset    Cancer Mother      No Known Problems Father      Other (CVA) Sister      Hypertension Sister      Breast cancer Sister      Hypertension Brother       Social History     Tobacco Use    Smoking status: Never     Passive exposure: Never    Smokeless tobacco: Never   Vaping Use    Vaping status: Never Used   Substance Use Topics    Alcohol use: Never    Drug use: Never       Physical Exam   ED Triage Vitals [12/27/24 0845]   Temperature Heart Rate Respirations BP   36.6 °C (97.8 °F) 74 18 159/65      Pulse Ox Temp src Heart Rate Source Patient Position   94 % -- -- --      BP Location FiO2 (%)     -- --       Physical Exam      ED Course & MDM   Diagnoses as of 12/27/24 0937   Strain of left groin                 No data recorded     Alexander Coma Scale Score: 15 (12/27/24 0846 : Chad Miller, DANYELLE)                           Medical Decision Making  XR hip left with pelvis when performed 2 or 3 views   Final Result    No acute osseous abnormality.    Signed by Roman Deluca MD      Medical Decision Making:    Patient appears well nontoxic.  X-ray of left hip negative.  Treated with oral Tylenol.  After discussion with patient she will be treated with over-the-counter Tylenol and Lidoderm patches.  Advised on rest and ice.  Stable at time of discharge.    Differential Diagnoses Considered: Muscle strain, fracture, dislocation    Independent Interpretation of Studies:  I independently interpreted: Left hip x-ray shows no acute fracture or dislocation.    Escalation of Care:  Appropriate for discharge and follow-up with primary care.    Prescription Drug Consideration: Oral Lidoderm          Procedure  Procedures     Jose Daniel Shah,   12/27/24 0931

## 2024-12-30 ENCOUNTER — OFFICE VISIT (OUTPATIENT)
Dept: PAIN MEDICINE | Facility: CLINIC | Age: 89
End: 2024-12-30
Payer: COMMERCIAL

## 2024-12-30 VITALS
BODY MASS INDEX: 26.39 KG/M2 | HEART RATE: 73 BPM | WEIGHT: 149 LBS | DIASTOLIC BLOOD PRESSURE: 82 MMHG | SYSTOLIC BLOOD PRESSURE: 139 MMHG | RESPIRATION RATE: 16 BRPM

## 2024-12-30 DIAGNOSIS — M46.1 SACROILIITIS (CMS-HCC): Primary | ICD-10-CM

## 2024-12-30 PROCEDURE — 99213 OFFICE O/P EST LOW 20 MIN: CPT

## 2024-12-30 ASSESSMENT — ENCOUNTER SYMPTOMS
LIGHT-HEADEDNESS: 0
PALPITATIONS: 0
MYALGIAS: 1
DYSPHORIC MOOD: 0
ARTHRALGIAS: 0
BACK PAIN: 1
FACIAL ASYMMETRY: 0
EYES NEGATIVE: 1
CONSTITUTIONAL NEGATIVE: 1
SHORTNESS OF BREATH: 0
WEAKNESS: 0
ENDOCRINE NEGATIVE: 1
COUGH: 0
BRUISES/BLEEDS EASILY: 0
ADENOPATHY: 0
WHEEZING: 0
ALLERGIC/IMMUNOLOGIC NEGATIVE: 1

## 2024-12-30 NOTE — PROGRESS NOTES
Subjective   Patient ID: Savannah Yao is a 89 y.o. female who presents for Follow-up (FOLLOW UP LEFT SIJ DONE 12/6/24, SHE HAS GOTTEN GREAT RELIEF FROM THE PROCEDURE, EVERYTHING FEELS BETTER SHE SAYS, SHE IS ABLE TO WALK,STAND,TRANSITION WITHOUT MUCH PAIN, SHE STILL AMBULATES WITH A WALKER, ). SHE CONTINUES WITH CHIROPRACTOR PRN, BLUE EMU CREAM, HEATING PAD FOR RELIEF, PAIN SCORE 1/10, EVETTE=0%  Joselyn Medellin, [facility] 12/30/24 10:00 AM     The patient is a 89-year-old female presents today for a follow-up after undergoing a left SI joint injection on 12/6/2024.  The patient reports significant relief from this procedure with 99% pain reduction ongoing.  She currently rates her pain 1/10 in that right buttock/hip region.  She is better able to function at home and perform ADLs without much difficulty.  She is very happy with the results of this injection, and would happily repeated in the future if needed.        Review of Systems   Constitutional: Negative.    HENT: Negative.     Eyes: Negative.    Respiratory:  Negative for cough, shortness of breath and wheezing.    Cardiovascular:  Negative for chest pain, palpitations and leg swelling.   Endocrine: Negative.    Genitourinary: Negative.    Musculoskeletal:  Positive for back pain and myalgias. Negative for arthralgias.   Skin: Negative.    Allergic/Immunologic: Negative.    Neurological:  Negative for facial asymmetry, weakness and light-headedness.   Hematological:  Negative for adenopathy. Does not bruise/bleed easily.   Psychiatric/Behavioral:  Negative for dysphoric mood and suicidal ideas.        Objective   Physical Exam  Constitutional:       General: She is not in acute distress.     Appearance: Normal appearance.   HENT:      Head: Normocephalic.      Mouth/Throat:      Mouth: Mucous membranes are moist.   Eyes:      Extraocular Movements: Extraocular movements intact.   Cardiovascular:      Rate and Rhythm: Normal rate and regular rhythm.      Pulses:  Normal pulses.      Heart sounds: Normal heart sounds. No murmur heard.     No friction rub. No gallop.   Pulmonary:      Effort: Pulmonary effort is normal.      Breath sounds: Normal breath sounds. No wheezing, rhonchi or rales.   Abdominal:      General: Abdomen is flat.      Palpations: Abdomen is soft.   Musculoskeletal:      Cervical back: Normal range of motion.      Right lower leg: No edema.      Left lower leg: No edema.      Comments: Ambulates with a walker  Strength 5/5 BLE   Lymphadenopathy:      Cervical: No cervical adenopathy.   Skin:     General: Skin is warm and dry.   Neurological:      General: No focal deficit present.      Mental Status: She is alert and oriented to person, place, and time. Mental status is at baseline.   Psychiatric:         Mood and Affect: Mood normal.         Behavior: Behavior normal.         Assessment/Plan   Diagnoses and all orders for this visit:  Sacroiliitis (CMS-HCC)       The patient is a 89-year-old female with a past medical history significant for the above-mentioned problem.  She is following up after left SI joint injection with significant ongoing relief.  She does not have any further questions or concerns about her pain at this time.  She would like to follow-up with us on an as-needed basis, call the clinic if needed.

## 2025-01-02 ENCOUNTER — LAB (OUTPATIENT)
Facility: LAB | Age: OVER 89
End: 2025-01-02
Payer: COMMERCIAL

## 2025-01-02 DIAGNOSIS — E11.9 CONTROLLED TYPE 2 DIABETES MELLITUS WITHOUT COMPLICATION, WITHOUT LONG-TERM CURRENT USE OF INSULIN (MULTI): ICD-10-CM

## 2025-01-02 DIAGNOSIS — I10 BENIGN ESSENTIAL HTN: ICD-10-CM

## 2025-01-02 DIAGNOSIS — E78.2 MIXED HYPERLIPIDEMIA: ICD-10-CM

## 2025-01-02 DIAGNOSIS — N28.9 RENAL INSUFFICIENCY: ICD-10-CM

## 2025-01-02 LAB
ALBUMIN SERPL BCP-MCNC: 3.9 G/DL (ref 3.4–5)
ALP SERPL-CCNC: 41 U/L (ref 33–136)
ALT SERPL W P-5'-P-CCNC: 16 U/L (ref 7–45)
ANION GAP SERPL CALC-SCNC: 11 MMOL/L (ref 10–20)
AST SERPL W P-5'-P-CCNC: 20 U/L (ref 9–39)
BILIRUB SERPL-MCNC: 0.9 MG/DL (ref 0–1.2)
BUN SERPL-MCNC: 30 MG/DL (ref 6–23)
CALCIUM SERPL-MCNC: 9.3 MG/DL (ref 8.6–10.3)
CHLORIDE SERPL-SCNC: 103 MMOL/L (ref 98–107)
CO2 SERPL-SCNC: 31 MMOL/L (ref 21–32)
CREAT SERPL-MCNC: 1.17 MG/DL (ref 0.5–1.05)
EGFRCR SERPLBLD CKD-EPI 2021: 44 ML/MIN/1.73M*2
EST. AVERAGE GLUCOSE BLD GHB EST-MCNC: 146 MG/DL
GLUCOSE SERPL-MCNC: 110 MG/DL (ref 74–99)
HBA1C MFR BLD: 6.7 %
LDLC SERPL DIRECT ASSAY-MCNC: 85 MG/DL (ref 0–129)
POTASSIUM SERPL-SCNC: 4.3 MMOL/L (ref 3.5–5.3)
PROT SERPL-MCNC: 6.3 G/DL (ref 6.4–8.2)
SODIUM SERPL-SCNC: 141 MMOL/L (ref 136–145)

## 2025-01-02 PROCEDURE — 80053 COMPREHEN METABOLIC PANEL: CPT

## 2025-01-02 PROCEDURE — 83036 HEMOGLOBIN GLYCOSYLATED A1C: CPT

## 2025-01-02 PROCEDURE — 83721 ASSAY OF BLOOD LIPOPROTEIN: CPT

## 2025-01-07 ENCOUNTER — APPOINTMENT (OUTPATIENT)
Dept: PRIMARY CARE | Facility: CLINIC | Age: OVER 89
End: 2025-01-07
Payer: COMMERCIAL

## 2025-01-07 VITALS
DIASTOLIC BLOOD PRESSURE: 60 MMHG | SYSTOLIC BLOOD PRESSURE: 140 MMHG | WEIGHT: 150.4 LBS | BODY MASS INDEX: 26.65 KG/M2 | OXYGEN SATURATION: 96 % | HEIGHT: 63 IN | HEART RATE: 69 BPM

## 2025-01-07 DIAGNOSIS — E78.2 MIXED HYPERLIPIDEMIA: ICD-10-CM

## 2025-01-07 DIAGNOSIS — E03.9 ACQUIRED HYPOTHYROIDISM: ICD-10-CM

## 2025-01-07 DIAGNOSIS — N28.9 RENAL INSUFFICIENCY: ICD-10-CM

## 2025-01-07 DIAGNOSIS — Z00.00 ROUTINE GENERAL MEDICAL EXAMINATION AT HEALTH CARE FACILITY: Primary | ICD-10-CM

## 2025-01-07 DIAGNOSIS — I10 BENIGN ESSENTIAL HTN: ICD-10-CM

## 2025-01-07 DIAGNOSIS — E11.9 CONTROLLED TYPE 2 DIABETES MELLITUS WITHOUT COMPLICATION, WITHOUT LONG-TERM CURRENT USE OF INSULIN (MULTI): ICD-10-CM

## 2025-01-07 DIAGNOSIS — M46.1 SACROILIITIS (CMS-HCC): ICD-10-CM

## 2025-01-07 DIAGNOSIS — N18.32 CHRONIC KIDNEY DISEASE, STAGE 3B (MULTI): ICD-10-CM

## 2025-01-07 DIAGNOSIS — K21.9 GASTROESOPHAGEAL REFLUX DISEASE WITHOUT ESOPHAGITIS: ICD-10-CM

## 2025-01-07 PROCEDURE — 3078F DIAST BP <80 MM HG: CPT | Performed by: FAMILY MEDICINE

## 2025-01-07 PROCEDURE — 99214 OFFICE O/P EST MOD 30 MIN: CPT | Performed by: FAMILY MEDICINE

## 2025-01-07 PROCEDURE — 1123F ACP DISCUSS/DSCN MKR DOCD: CPT | Performed by: FAMILY MEDICINE

## 2025-01-07 PROCEDURE — 1158F ADVNC CARE PLAN TLK DOCD: CPT | Performed by: FAMILY MEDICINE

## 2025-01-07 PROCEDURE — 1159F MED LIST DOCD IN RCRD: CPT | Performed by: FAMILY MEDICINE

## 2025-01-07 PROCEDURE — 1160F RVW MEDS BY RX/DR IN RCRD: CPT | Performed by: FAMILY MEDICINE

## 2025-01-07 PROCEDURE — 1036F TOBACCO NON-USER: CPT | Performed by: FAMILY MEDICINE

## 2025-01-07 PROCEDURE — 3077F SYST BP >= 140 MM HG: CPT | Performed by: FAMILY MEDICINE

## 2025-01-07 PROCEDURE — 1170F FXNL STATUS ASSESSED: CPT | Performed by: FAMILY MEDICINE

## 2025-01-07 PROCEDURE — G0439 PPPS, SUBSEQ VISIT: HCPCS | Performed by: FAMILY MEDICINE

## 2025-01-07 RX ORDER — ATENOLOL 50 MG/1
50 TABLET ORAL DAILY
Qty: 90 TABLET | Refills: 3 | Status: SHIPPED | OUTPATIENT
Start: 2025-01-07 | End: 2026-01-07

## 2025-01-07 RX ORDER — PANTOPRAZOLE SODIUM 40 MG/1
40 TABLET, DELAYED RELEASE ORAL DAILY
Qty: 90 TABLET | Refills: 3 | Status: SHIPPED | OUTPATIENT
Start: 2025-01-07 | End: 2026-01-07

## 2025-01-07 RX ORDER — LEVOTHYROXINE SODIUM 75 UG/1
75 TABLET ORAL DAILY
Qty: 90 TABLET | Refills: 3 | Status: SHIPPED | OUTPATIENT
Start: 2025-01-07 | End: 2026-01-07

## 2025-01-07 RX ORDER — ROSUVASTATIN CALCIUM 20 MG/1
20 TABLET, COATED ORAL DAILY
Qty: 90 TABLET | Refills: 3 | Status: SHIPPED | OUTPATIENT
Start: 2025-01-07

## 2025-01-07 ASSESSMENT — ACTIVITIES OF DAILY LIVING (ADL)
DRESSING: INDEPENDENT
BATHING: INDEPENDENT
DOING_HOUSEWORK: INDEPENDENT
GROCERY_SHOPPING: INDEPENDENT
TAKING_MEDICATION: INDEPENDENT
MANAGING_FINANCES: INDEPENDENT

## 2025-01-07 ASSESSMENT — ENCOUNTER SYMPTOMS
WHEEZING: 0
NUMBNESS: 0
ARTHRALGIAS: 0
LIGHT-HEADEDNESS: 0
NAUSEA: 0
DIARRHEA: 0
COUGH: 0
OCCASIONAL FEELINGS OF UNSTEADINESS: 1
LOSS OF SENSATION IN FEET: 0
SHORTNESS OF BREATH: 0
UNEXPECTED WEIGHT CHANGE: 0
DEPRESSION: 0
RHINORRHEA: 0
DIZZINESS: 0
PALPITATIONS: 0
ABDOMINAL PAIN: 0
ADENOPATHY: 0
ACTIVITY CHANGE: 0
ABDOMINAL DISTENTION: 0
CONSTIPATION: 0
HEADACHES: 0
TROUBLE SWALLOWING: 0
FATIGUE: 0
VOMITING: 0
DIFFICULTY URINATING: 0
APPETITE CHANGE: 0

## 2025-01-07 ASSESSMENT — PATIENT HEALTH QUESTIONNAIRE - PHQ9
2. FEELING DOWN, DEPRESSED OR HOPELESS: NOT AT ALL
1. LITTLE INTEREST OR PLEASURE IN DOING THINGS: NOT AT ALL
SUM OF ALL RESPONSES TO PHQ9 QUESTIONS 1 AND 2: 0

## 2025-01-07 NOTE — ASSESSMENT & PLAN NOTE
A1c testing below 7, continue with current treatment.  Orders:    Follow Up In Primary Care - Established    Follow Up In Primary Care - Established; Future    Albumin-Creatinine Ratio, Urine Random; Future    Comprehensive Metabolic Panel; Future    Hemoglobin A1C; Future    Thyroid Stimulating Hormone; Future    Lipid Panel; Future

## 2025-01-07 NOTE — ASSESSMENT & PLAN NOTE
Renal function very stable, continue with current medication, check CBC at follow-up appointment.  Orders:    Follow Up In Primary Care - Established    Follow Up In Primary Care - Established; Future    CBC and Auto Differential; Future    Albumin-Creatinine Ratio, Urine Random; Future    Comprehensive Metabolic Panel; Future

## 2025-01-07 NOTE — PROGRESS NOTES
"Subjective   Reason for Visit: Savannah Yao is an 89 y.o. female here for a Medicare Wellness visit.     Past Medical, Surgical, and Family History reviewed and updated in chart.    Reviewed all medications by prescribing practitioner or clinical pharmacist (such as prescriptions, OTCs, herbal therapies and supplements) and documented in the medical record.    HPI  No headache, chest pain, shortness of breath, dizziness, lightheadedness, or edema  The patient is taking thyroid medications as directed without problems, labs done in the last year, no symptoms of excessive fatigue, edema or weight gain.  Seeing pain medicine, had AIDA done month with some help  Seen vascular since last seen  Neck pain OK, using a walker, no falls  Voice stable, no dysphagia  HBP usually below 140/90    Patient Care Team:  Earl Sandy MD as PCP - General (Family Medicine)  Earl Sandy MD as PCP - [employer]age PCP  Earl Sandy MD as PCP - [employer] Medicare Advantage PCP     Review of Systems   Constitutional:  Negative for activity change, appetite change, fatigue and unexpected weight change.   HENT:  Negative for ear pain, nosebleeds, rhinorrhea, sneezing and trouble swallowing.    Respiratory:  Negative for cough, shortness of breath and wheezing.    Cardiovascular:  Negative for chest pain, palpitations and leg swelling.   Gastrointestinal:  Negative for abdominal distention, abdominal pain, constipation, diarrhea, nausea and vomiting.   Genitourinary:  Negative for difficulty urinating.   Musculoskeletal:  Negative for arthralgias.   Skin:  Negative for rash.   Neurological:  Negative for dizziness, light-headedness, numbness and headaches.   Hematological:  Negative for adenopathy.   Psychiatric/Behavioral:  Negative for behavioral problems.    All other systems reviewed and are negative.      Objective   Vitals:  /60   Pulse 69   Ht 1.6 m (5' 3\")   Wt 68.2 kg (150 lb 6.4 oz) "   SpO2 96%   BMI 26.64 kg/m²       Physical Exam  Vitals and nursing note reviewed.   Constitutional:       Appearance: Normal appearance.   HENT:      Head: Normocephalic and atraumatic.      Right Ear: Tympanic membrane, ear canal and external ear normal.      Left Ear: Tympanic membrane, ear canal and external ear normal.      Nose: Nose normal.      Mouth/Throat:      Mouth: Mucous membranes are moist.      Pharynx: Oropharynx is clear.   Cardiovascular:      Rate and Rhythm: Normal rate and regular rhythm.      Pulses: Normal pulses.      Heart sounds: Normal heart sounds.   Pulmonary:      Effort: Pulmonary effort is normal.      Breath sounds: Normal breath sounds.   Musculoskeletal:      Cervical back: Normal range of motion and neck supple.   Neurological:      Mental Status: She is alert.   Psychiatric:         Mood and Affect: Mood normal.         Behavior: Behavior normal.         Assessment & Plan  Benign essential HTN  Blood pressure is at goal, electrolyte and renal function stable, no change.  Orders:    Follow Up In Primary Care - Established    atenolol (Tenormin) 50 mg tablet; Take 1 tablet (50 mg) by mouth once daily.    Follow Up In Primary Care - Established; Future    Albumin-Creatinine Ratio, Urine Random; Future    Comprehensive Metabolic Panel; Future    Mixed hyperlipidemia  Liver function testing normal, LDL cholesterol supple, tolerating rosuvastatin.  Orders:    Follow Up In Primary Care - Established    rosuvastatin (Crestor) 20 mg tablet; Take 1 tablet (20 mg) by mouth once daily.    Follow Up In Primary Care - Established; Future    Comprehensive Metabolic Panel; Future    Lipid Panel; Future    Controlled type 2 diabetes mellitus without complication, without long-term current use of insulin (Multi)  A1c testing below 7, continue with current treatment.  Orders:    Follow Up In Primary Care - Established    Follow Up In Primary Care - Established; Future    Albumin-Creatinine  Ratio, Urine Random; Future    Comprehensive Metabolic Panel; Future    Hemoglobin A1C; Future    Thyroid Stimulating Hormone; Future    Lipid Panel; Future    Acquired hypothyroidism  TSH acceptable, continue with current dose of levothyroxine.  Orders:    Follow Up In Primary Care - Established    levothyroxine (Synthroid, Levoxyl) 75 mcg tablet; Take 1 tablet (75 mcg) by mouth once daily.    Follow Up In Primary Care - Established; Future    Thyroid Stimulating Hormone; Future    Gastroesophageal reflux disease without esophagitis  Denies any dysphagia continue with pantoprazole.  Orders:    Follow Up In Primary Care - Established    pantoprazole (ProtoNix) 40 mg EC tablet; Take 1 tablet (40 mg) by mouth once daily.    Follow Up In Primary Care - Established; Future    Renal insufficiency  Renal function very stable, continue with current medication, check CBC at follow-up appointment.  Orders:    Follow Up In Primary Care - [facility]    Follow Up In Primary Care - [facility]; Future    CBC and Auto Differential; Future    Albumin-Creatinine Ratio, Urine Random; Future    Comprehensive Metabolic Panel; Future    Routine general medical examination at health care facility    Orders:    Follow Up In Primary Care - [facility]; Future    Sacroiliitis (CMS-HCC)  Sees pain management, uses a wheeled walker, has not had any falls in the past 3 to 6 months.         Chronic kidney disease, stage 3b (Multi)  Renal function very stable, blood pressure under good control, potassium and electrolytes are stable, no change.

## 2025-01-07 NOTE — ASSESSMENT & PLAN NOTE
Renal function very stable, blood pressure under good control, potassium and electrolytes are stable, no change.

## 2025-01-07 NOTE — ASSESSMENT & PLAN NOTE
Denies any dysphagia continue with pantoprazole.  Orders:    Follow Up In Primary Care - Established    pantoprazole (ProtoNix) 40 mg EC tablet; Take 1 tablet (40 mg) by mouth once daily.    Follow Up In Primary Care - Established; Future

## 2025-01-07 NOTE — ASSESSMENT & PLAN NOTE
Blood pressure is at goal, electrolyte and renal function stable, no change.  Orders:    Follow Up In Primary Care - Established    atenolol (Tenormin) 50 mg tablet; Take 1 tablet (50 mg) by mouth once daily.    Follow Up In Primary Care - Established; Future    Albumin-Creatinine Ratio, Urine Random; Future    Comprehensive Metabolic Panel; Future

## 2025-01-07 NOTE — ASSESSMENT & PLAN NOTE
Sees pain management, uses a wheeled walker, has not had any falls in the past 3 to 6 months.

## 2025-01-07 NOTE — ASSESSMENT & PLAN NOTE
Liver function testing normal, LDL cholesterol supple, tolerating rosuvastatin.  Orders:    Follow Up In Primary Care - Established    rosuvastatin (Crestor) 20 mg tablet; Take 1 tablet (20 mg) by mouth once daily.    Follow Up In Primary Care - Established; Future    Comprehensive Metabolic Panel; Future    Lipid Panel; Future

## 2025-01-07 NOTE — ASSESSMENT & PLAN NOTE
TSH acceptable, continue with current dose of levothyroxine.  Orders:    Follow Up In Primary Care - Established    levothyroxine (Synthroid, Levoxyl) 75 mcg tablet; Take 1 tablet (75 mcg) by mouth once daily.    Follow Up In Primary Care - Established; Future    Thyroid Stimulating Hormone; Future

## 2025-03-24 ENCOUNTER — OFFICE VISIT (OUTPATIENT)
Dept: PAIN MEDICINE | Facility: CLINIC | Age: OVER 89
End: 2025-03-24
Payer: COMMERCIAL

## 2025-03-24 VITALS
SYSTOLIC BLOOD PRESSURE: 147 MMHG | HEART RATE: 71 BPM | WEIGHT: 148 LBS | BODY MASS INDEX: 26.22 KG/M2 | RESPIRATION RATE: 16 BRPM | DIASTOLIC BLOOD PRESSURE: 75 MMHG

## 2025-03-24 DIAGNOSIS — M46.1 SACROILIITIS (CMS-HCC): Primary | ICD-10-CM

## 2025-03-24 PROCEDURE — 99213 OFFICE O/P EST LOW 20 MIN: CPT

## 2025-03-24 RX ORDER — BUPIVACAINE HYDROCHLORIDE 5 MG/ML
2 INJECTION, SOLUTION EPIDURAL; INTRACAUDAL; PERINEURAL ONCE
OUTPATIENT
Start: 2025-03-24 | End: 2025-03-24

## 2025-03-24 RX ORDER — LIDOCAINE HYDROCHLORIDE 20 MG/ML
6 INJECTION, SOLUTION EPIDURAL; INFILTRATION; INTRACAUDAL; PERINEURAL ONCE
OUTPATIENT
Start: 2025-03-24 | End: 2025-03-24

## 2025-03-24 RX ORDER — METHYLPREDNISOLONE ACETATE 40 MG/ML
40 INJECTION, SUSPENSION INTRA-ARTICULAR; INTRALESIONAL; INTRAMUSCULAR; SOFT TISSUE ONCE
OUTPATIENT
Start: 2025-03-24 | End: 2025-03-24

## 2025-03-24 ASSESSMENT — ENCOUNTER SYMPTOMS
ADENOPATHY: 0
BRUISES/BLEEDS EASILY: 0
WHEEZING: 0
CONSTITUTIONAL NEGATIVE: 1
WEAKNESS: 0
COUGH: 0
ENDOCRINE NEGATIVE: 1
MYALGIAS: 1
ALLERGIC/IMMUNOLOGIC NEGATIVE: 1
PALPITATIONS: 0
EYES NEGATIVE: 1
SHORTNESS OF BREATH: 0
FACIAL ASYMMETRY: 0
DYSPHORIC MOOD: 0
BACK PAIN: 1
ARTHRALGIAS: 0
LIGHT-HEADEDNESS: 0

## 2025-03-24 NOTE — PROGRESS NOTES
Subjective   Patient ID: Savannah Yao is a 89 y.o. female who presents for Follow-up ([facility] pt request wants to discuss  Hip pain.  Today she reports having intermittent Lt hip pointing to her lower gluteal  and around to her Lt side thigh, started 3 weeks ago and has been worse the past 2 weeks, rates pain level 1/10 now and 4/10 at worst, describes as dull ache. She is taking Tylenol, using Blue Emu cream and see a Chiropractor for her hip pain  it helps for a while. Her last LT SIJ was 12/6/24. ) EVETTE score  2%,   COMM 0, screenings: depression,  Falls,  smoking all negative.   Pauly Parisi RN 03/24/25 9:54 AM     The patient is a 89-year-old female who presents today for a follow-up appointment with returning hip pain.  She currently rates her pain a 1/10, says it does get up to 4/10 at its worst.  The pain is in her left buttock/lateral hip region, and confirmed with the patient that it is her left side, unlike the right that is started in the nursing note.  The pain makes it harder to ambulate, and makes it difficult to function around the house and harder to perform ADLs.  She last had a left SI joint injection on 12/6/2024 and got significant relief for 3 months, only recently wearing off within the last couple of weeks.  She is interested in repeating it at this time, saying it is the same pain as before the last injection.  She does not have any further questions or concerns about her pain at this time.        Review of Systems   Constitutional: Negative.    HENT: Negative.     Eyes: Negative.    Respiratory:  Negative for cough, shortness of breath and wheezing.    Cardiovascular:  Negative for chest pain, palpitations and leg swelling.   Endocrine: Negative.    Genitourinary: Negative.    Musculoskeletal:  Positive for back pain and myalgias. Negative for arthralgias.   Skin: Negative.    Allergic/Immunologic: Negative.    Neurological:  Negative for facial asymmetry, weakness and light-headedness.    Hematological:  Negative for adenopathy. Does not bruise/bleed easily.   Psychiatric/Behavioral:  Negative for dysphoric mood and suicidal ideas.        Objective   Physical Exam  Constitutional:       General: She is not in acute distress.     Appearance: Normal appearance.   HENT:      Head: Normocephalic.      Mouth/Throat:      Mouth: Mucous membranes are moist.   Eyes:      Extraocular Movements: Extraocular movements intact.   Cardiovascular:      Rate and Rhythm: Normal rate and regular rhythm.      Pulses: Normal pulses.      Heart sounds: Normal heart sounds. No murmur heard.     No friction rub. No gallop.   Pulmonary:      Effort: Pulmonary effort is normal.      Breath sounds: Normal breath sounds. No wheezing, rhonchi or rales.   Abdominal:      General: Abdomen is flat.      Palpations: Abdomen is soft.   Musculoskeletal:      Cervical back: Normal range of motion.      Right lower leg: No edema.      Left lower leg: No edema.      Comments: Ambulates with a walker  Strength 5/5 BLE  No lumbar paraspinal TTP  Left Poncho finger positive  DINORAH positive left, negative right  Distraction positive left, negative right  Compression positive left, negative right   Lymphadenopathy:      Cervical: No cervical adenopathy.   Skin:     General: Skin is warm and dry.   Neurological:      General: No focal deficit present.      Mental Status: She is alert and oriented to person, place, and time. Mental status is at baseline.   Psychiatric:         Mood and Affect: Mood normal.         Behavior: Behavior normal.         Assessment/Plan   Diagnoses and all orders for this visit:  Sacroiliitis (CMS-[facility])  -     FL pain management; Future  -     Sacroiliac Joint Injection; Future  Other orders  -     lidocaine PF (Xylocaine) 20 mg/mL (2 %) injection 120 mg  -     iohexol (OMNIPaque) 300 mg iodine/mL solution 3 mL  -     bupivacaine PF (Marcaine) 0.5 % (5 mg/mL) injection 10 mg  -     methylPREDNISolone acetate  (DEPO-Medrol) injection 40 mg  -     NPO Diet Except: Sips with meds; Effective now; Standing  -     Height and weight; Standing  -     POCT Glucose; Standing  -     Type And Screen; Standing  -     Inpatient consult to Respiratory Care; Standing  -     Adult diet Regular; Standing  -     Vital Signs; Standing  -     Notify provider (specify parameters); Standing  -     Continue IV fluids ordered pre-procedure; Standing  -     Prior to Discharge O2 Weaning; Standing  -     Pulse oximetry, continuous; Standing  -     Discharge patient; Standing       The patient is a 89-year-old female with a past medical history significant for the above-mentioned problems.  Her left buttock/hip pain is returning, and she is interested in repeat injection.  Based on her imaging findings, her pain pattern, her physical exam, I recommend repeating the left sacroiliac joint injection under fluoroscopy.  The procedure was discussed, risks and benefits were discussed, patient is agreeable.  She will follow-up after the injection for reevaluation, call the clinic sooner if needed.

## 2025-03-24 NOTE — H&P (VIEW-ONLY)
Subjective   Patient ID: Savannah Yao is a 89 y.o. female who presents for Follow-up ([facility] pt request wants to discuss  Hip pain.  Today she reports having intermittent Lt hip pointing to her lower gluteal  and around to her Lt side thigh, started 3 weeks ago and has been worse the past 2 weeks, rates pain level 1/10 now and 4/10 at worst, describes as dull ache. She is taking Tylenol, using Blue Emu cream and see a Chiropractor for her hip pain  it helps for a while. Her last LT SIJ was 12/6/24. ) EVETTE score  2%,   COMM 0, screenings: depression,  Falls,  smoking all negative.   Pauly Parisi RN 03/24/25 9:54 AM     The patient is a 89-year-old female who presents today for a follow-up appointment with returning hip pain.  She currently rates her pain a 1/10, says it does get up to 4/10 at its worst.  The pain is in her left buttock/lateral hip region, and confirmed with the patient that it is her left side, unlike the right that is started in the nursing note.  The pain makes it harder to ambulate, and makes it difficult to function around the house and harder to perform ADLs.  She last had a left SI joint injection on 12/6/2024 and got significant relief for 3 months, only recently wearing off within the last couple of weeks.  She is interested in repeating it at this time, saying it is the same pain as before the last injection.  She does not have any further questions or concerns about her pain at this time.        Review of Systems   Constitutional: Negative.    HENT: Negative.     Eyes: Negative.    Respiratory:  Negative for cough, shortness of breath and wheezing.    Cardiovascular:  Negative for chest pain, palpitations and leg swelling.   Endocrine: Negative.    Genitourinary: Negative.    Musculoskeletal:  Positive for back pain and myalgias. Negative for arthralgias.   Skin: Negative.    Allergic/Immunologic: Negative.    Neurological:  Negative for facial asymmetry, weakness and light-headedness.    Hematological:  Negative for adenopathy. Does not bruise/bleed easily.   Psychiatric/Behavioral:  Negative for dysphoric mood and suicidal ideas.        Objective   Physical Exam  Constitutional:       General: She is not in acute distress.     Appearance: Normal appearance.   HENT:      Head: Normocephalic.      Mouth/Throat:      Mouth: Mucous membranes are moist.   Eyes:      Extraocular Movements: Extraocular movements intact.   Cardiovascular:      Rate and Rhythm: Normal rate and regular rhythm.      Pulses: Normal pulses.      Heart sounds: Normal heart sounds. No murmur heard.     No friction rub. No gallop.   Pulmonary:      Effort: Pulmonary effort is normal.      Breath sounds: Normal breath sounds. No wheezing, rhonchi or rales.   Abdominal:      General: Abdomen is flat.      Palpations: Abdomen is soft.   Musculoskeletal:      Cervical back: Normal range of motion.      Right lower leg: No edema.      Left lower leg: No edema.      Comments: Ambulates with a walker  Strength 5/5 BLE  No lumbar paraspinal TTP  Left Poncho finger positive  DINORAH positive left, negative right  Distraction positive left, negative right  Compression positive left, negative right   Lymphadenopathy:      Cervical: No cervical adenopathy.   Skin:     General: Skin is warm and dry.   Neurological:      General: No focal deficit present.      Mental Status: She is alert and oriented to person, place, and time. Mental status is at baseline.   Psychiatric:         Mood and Affect: Mood normal.         Behavior: Behavior normal.         Assessment/Plan   Diagnoses and all orders for this visit:  Sacroiliitis (CMS-[facility])  -     FL pain management; Future  -     Sacroiliac Joint Injection; Future  Other orders  -     lidocaine PF (Xylocaine) 20 mg/mL (2 %) injection 120 mg  -     iohexol (OMNIPaque) 300 mg iodine/mL solution 3 mL  -     bupivacaine PF (Marcaine) 0.5 % (5 mg/mL) injection 10 mg  -     methylPREDNISolone acetate  (DEPO-Medrol) injection 40 mg  -     NPO Diet Except: Sips with meds; Effective now; Standing  -     Height and weight; Standing  -     POCT Glucose; Standing  -     Type And Screen; Standing  -     Inpatient consult to Respiratory Care; Standing  -     Adult diet Regular; Standing  -     Vital Signs; Standing  -     Notify provider (specify parameters); Standing  -     Continue IV fluids ordered pre-procedure; Standing  -     Prior to Discharge O2 Weaning; Standing  -     Pulse oximetry, continuous; Standing  -     Discharge patient; Standing       The patient is a 89-year-old female with a past medical history significant for the above-mentioned problems.  Her left buttock/hip pain is returning, and she is interested in repeat injection.  Based on her imaging findings, her pain pattern, her physical exam, I recommend repeating the left sacroiliac joint injection under fluoroscopy.  The procedure was discussed, risks and benefits were discussed, patient is agreeable.  She will follow-up after the injection for reevaluation, call the clinic sooner if needed.

## 2025-03-25 ENCOUNTER — TELEPHONE (OUTPATIENT)
Dept: PAIN MEDICINE | Facility: CLINIC | Age: OVER 89
End: 2025-03-25
Payer: COMMERCIAL

## 2025-03-28 ENCOUNTER — TELEPHONE (OUTPATIENT)
Age: OVER 89
End: 2025-03-28
Payer: COMMERCIAL

## 2025-03-28 NOTE — TELEPHONE ENCOUNTER
CIPRIANO CROONA IS PATIENT'S BROTHER. LEFT MESSAGE ON HER VOICE MAIL TO CALL THE OFFICE FOR THE INFORMATION BELOW.       I will be out this week.  If they are okay he can be admitted to Dr. mathews in my absence     You routed conversation to Danny Vargas MD19 hours ago (1:31 PM)     You19 hours ago (1:31 PM)       PATIENT'S SISTER CALLED TO REPORT HE HAS A NON-HEALING SORE ON HEEL. HE DESIRES TO GO TO [facility], BUT THEY WILL NOT ACCEPT HIM WITH AN OPEN SORE. WHITLEY ASKS FOR YOU TO SEE PATIENT AT [place].

## 2025-04-04 ENCOUNTER — HOSPITAL ENCOUNTER (OUTPATIENT)
Dept: OPERATING ROOM | Facility: HOSPITAL | Age: OVER 89
Setting detail: OUTPATIENT SURGERY
Discharge: HOME | End: 2025-04-04
Payer: COMMERCIAL

## 2025-04-04 VITALS
RESPIRATION RATE: 18 BRPM | TEMPERATURE: 97.1 F | HEIGHT: 63 IN | SYSTOLIC BLOOD PRESSURE: 163 MMHG | WEIGHT: 149 LBS | BODY MASS INDEX: 26.4 KG/M2 | DIASTOLIC BLOOD PRESSURE: 63 MMHG | HEART RATE: 76 BPM | OXYGEN SATURATION: 93 %

## 2025-04-04 DIAGNOSIS — M46.1 SACROILIITIS (CMS-HCC): ICD-10-CM

## 2025-04-04 PROCEDURE — 2550000001 HC RX 255 CONTRASTS: Performed by: STUDENT IN AN ORGANIZED HEALTH CARE EDUCATION/TRAINING PROGRAM

## 2025-04-04 PROCEDURE — 2500000004 HC RX 250 GENERAL PHARMACY W/ HCPCS (ALT 636 FOR OP/ED): Mod: JZ | Performed by: STUDENT IN AN ORGANIZED HEALTH CARE EDUCATION/TRAINING PROGRAM

## 2025-04-04 PROCEDURE — 27096 INJECT SACROILIAC JOINT: CPT | Mod: LT | Performed by: STUDENT IN AN ORGANIZED HEALTH CARE EDUCATION/TRAINING PROGRAM

## 2025-04-04 RX ORDER — METHYLPREDNISOLONE ACETATE 40 MG/ML
INJECTION, SUSPENSION INTRA-ARTICULAR; INTRALESIONAL; INTRAMUSCULAR; SOFT TISSUE AS NEEDED
Status: COMPLETED | OUTPATIENT
Start: 2025-04-04 | End: 2025-04-04

## 2025-04-04 RX ORDER — BUPIVACAINE HYDROCHLORIDE 5 MG/ML
INJECTION, SOLUTION EPIDURAL; INTRACAUDAL; PERINEURAL AS NEEDED
Status: COMPLETED | OUTPATIENT
Start: 2025-04-04 | End: 2025-04-04

## 2025-04-04 RX ORDER — LIDOCAINE HYDROCHLORIDE 20 MG/ML
INJECTION, SOLUTION EPIDURAL; INFILTRATION; INTRACAUDAL; PERINEURAL AS NEEDED
Status: COMPLETED | OUTPATIENT
Start: 2025-04-04 | End: 2025-04-04

## 2025-04-04 RX ADMIN — BUPIVACAINE HYDROCHLORIDE 2 ML: 5 INJECTION, SOLUTION EPIDURAL; INTRACAUDAL; PERINEURAL at 14:44

## 2025-04-04 RX ADMIN — IOHEXOL 1 ML: 300 INJECTION, SOLUTION INTRAVENOUS at 14:45

## 2025-04-04 RX ADMIN — LIDOCAINE HYDROCHLORIDE 3 ML: 20 INJECTION, SOLUTION EPIDURAL; INFILTRATION; INTRACAUDAL; PERINEURAL at 14:45

## 2025-04-04 RX ADMIN — METHYLPREDNISOLONE ACETATE 40 MG: 40 INJECTION, SUSPENSION INTRA-ARTICULAR; INTRALESIONAL; INTRAMUSCULAR; SOFT TISSUE at 14:44

## 2025-04-04 ASSESSMENT — PAIN - FUNCTIONAL ASSESSMENT: PAIN_FUNCTIONAL_ASSESSMENT: 0-10

## 2025-04-04 ASSESSMENT — COLUMBIA-SUICIDE SEVERITY RATING SCALE - C-SSRS
2. HAVE YOU ACTUALLY HAD ANY THOUGHTS OF KILLING YOURSELF?: NO
1. IN THE PAST MONTH, HAVE YOU WISHED YOU WERE DEAD OR WISHED YOU COULD GO TO SLEEP AND NOT WAKE UP?: NO
6. HAVE YOU EVER DONE ANYTHING, STARTED TO DO ANYTHING, OR PREPARED TO DO ANYTHING TO END YOUR LIFE?: NO

## 2025-04-04 ASSESSMENT — PAIN DESCRIPTION - DESCRIPTORS: DESCRIPTORS: DULL;ACHING

## 2025-04-04 ASSESSMENT — PAIN SCALES - GENERAL: PAINLEVEL_OUTOF10: 1

## 2025-04-04 NOTE — PERIOPERATIVE NURSING NOTE
Discharge instructions reviewed  verbally by   Savannah EDOUARD LPN      written instructions provided to pt, no questions and verbalized understanding.   discharged amb steady gait, to exit   to be driven home by yosef Underwood

## 2025-04-28 ENCOUNTER — OFFICE VISIT (OUTPATIENT)
Dept: PAIN MEDICINE | Facility: CLINIC | Age: OVER 89
End: 2025-04-28
Payer: COMMERCIAL

## 2025-04-28 VITALS
SYSTOLIC BLOOD PRESSURE: 157 MMHG | DIASTOLIC BLOOD PRESSURE: 77 MMHG | HEART RATE: 76 BPM | WEIGHT: 154 LBS | RESPIRATION RATE: 12 BRPM | BODY MASS INDEX: 27.28 KG/M2

## 2025-04-28 DIAGNOSIS — M46.1 SACROILIITIS (CMS-HCC): Primary | ICD-10-CM

## 2025-04-28 PROCEDURE — 99213 OFFICE O/P EST LOW 20 MIN: CPT

## 2025-04-28 ASSESSMENT — ENCOUNTER SYMPTOMS
ENDOCRINE NEGATIVE: 1
SHORTNESS OF BREATH: 0
COUGH: 0
PALPITATIONS: 0
ARTHRALGIAS: 0
WHEEZING: 0
FACIAL ASYMMETRY: 0
ADENOPATHY: 0
LIGHT-HEADEDNESS: 0
WEAKNESS: 0
ALLERGIC/IMMUNOLOGIC NEGATIVE: 1
MYALGIAS: 1
BACK PAIN: 1
DYSPHORIC MOOD: 0
EYES NEGATIVE: 1
CONSTITUTIONAL NEGATIVE: 1
BRUISES/BLEEDS EASILY: 0

## 2025-04-28 ASSESSMENT — PATIENT HEALTH QUESTIONNAIRE - PHQ9
2. FEELING DOWN, DEPRESSED OR HOPELESS: NOT AT ALL
SUM OF ALL RESPONSES TO PHQ9 QUESTIONS 1 AND 2: 0
1. LITTLE INTEREST OR PLEASURE IN DOING THINGS: NOT AT ALL

## 2025-04-28 NOTE — PROGRESS NOTES
Subjective   Patient ID: Savannah Yao is a 89 y.o. female who presents for Follow-up (FOLLOW UP LEFT SIJ DONE ON 4/4/25, SHE HAS HAD GREAT RELIEF FROM THE PROCEDURE, SHE HASN'T HAD PAIN TO THE LEFT GLUTEAL OR THIGH SINCE THE PROCEDURE, SHE IS ABLE TO TRANSITION, STAND, WALK WITHOUT PAIN, SHE CONTINUES CHIROPRACTIC ADJUSTMENT AND USING BLUE EMU CREAM AND TAKING TYLENOL).PAIN SCORE 0/10, EVETTE=4%, ETOH NO  Joselyn Medellin, CMA 04/28/25 9:10 AM     The patient is a 89-year-old female presents today for a follow-up appointment after undergoing a left sacroiliac joint injection 4/4/2025.  The patient reports significant relief from this procedure with 100% pain relief that is ongoing.  She denies having any pain at this time.  She is better able to function on a daily basis and better able to perform ADLs due to the pain relief she has obtained from this injection.  If she has any pain Tylenol or blue emu cream is enough to take care of it.  She is very happy with the relief she has obtained and would happily repeated in the future if needed.        Review of Systems   Constitutional: Negative.    HENT: Negative.     Eyes: Negative.    Respiratory:  Negative for cough, shortness of breath and wheezing.    Cardiovascular:  Negative for chest pain, palpitations and leg swelling.   Endocrine: Negative.    Genitourinary: Negative.    Musculoskeletal:  Positive for back pain and myalgias. Negative for arthralgias.   Skin: Negative.    Allergic/Immunologic: Negative.    Neurological:  Negative for facial asymmetry, weakness and light-headedness.   Hematological:  Negative for adenopathy. Does not bruise/bleed easily.   Psychiatric/Behavioral:  Negative for dysphoric mood and suicidal ideas.        Objective   Physical Exam  Constitutional:       General: She is not in acute distress.     Appearance: Normal appearance.   HENT:      Head: Normocephalic.      Mouth/Throat:      Mouth: Mucous membranes are moist.   Eyes:       Extraocular Movements: Extraocular movements intact.   Cardiovascular:      Rate and Rhythm: Normal rate and regular rhythm.      Pulses: Normal pulses.      Heart sounds: Normal heart sounds. No murmur heard.     No friction rub. No gallop.   Pulmonary:      Effort: Pulmonary effort is normal.      Breath sounds: Normal breath sounds. No wheezing, rhonchi or rales.   Abdominal:      General: Abdomen is flat.      Palpations: Abdomen is soft.   Musculoskeletal:      Cervical back: Normal range of motion.      Right lower leg: No edema.      Left lower leg: No edema.      Comments: Ambulates with a walker  Strength 5/5 BLE   Lymphadenopathy:      Cervical: No cervical adenopathy.   Skin:     General: Skin is warm and dry.   Neurological:      General: No focal deficit present.      Mental Status: She is alert and oriented to person, place, and time. Mental status is at baseline.   Psychiatric:         Mood and Affect: Mood normal.         Behavior: Behavior normal.         Assessment/Plan   Diagnoses and all orders for this visit:  Sacroiliitis (CMS-[facility])       The patient is a 89-year-old female with a past medical history significant for the above-mentioned problems.  She is following up after left SIJ injection with significant ongoing relief.  She does not have any questions or concerns about her pain at this time.  We do not prescribe medications to her.  She would like to follow-up with us on an as-needed basis, call the clinic if needed.

## 2025-06-20 DIAGNOSIS — I10 BENIGN ESSENTIAL HTN: ICD-10-CM

## 2025-06-20 RX ORDER — AMLODIPINE BESYLATE 5 MG/1
5 TABLET ORAL DAILY
Qty: 90 TABLET | Refills: 3 | Status: SHIPPED | OUTPATIENT
Start: 2025-06-20 | End: 2026-06-20

## 2025-06-26 LAB
ALBUMIN SERPL-MCNC: 4.2 G/DL (ref 3.6–5.1)
ALBUMIN/CREAT UR: 9 MG/G CREAT
ALP SERPL-CCNC: 43 U/L (ref 37–153)
ALT SERPL-CCNC: 17 U/L (ref 6–29)
ANION GAP SERPL CALCULATED.4IONS-SCNC: 10 MMOL/L (CALC) (ref 7–17)
AST SERPL-CCNC: 20 U/L (ref 10–35)
BASOPHILS # BLD AUTO: 32 CELLS/UL (ref 0–200)
BASOPHILS NFR BLD AUTO: 0.4 %
BILIRUB SERPL-MCNC: 1.1 MG/DL (ref 0.2–1.2)
BUN SERPL-MCNC: 18 MG/DL (ref 7–25)
CALCIUM SERPL-MCNC: 9.4 MG/DL (ref 8.6–10.4)
CHLORIDE SERPL-SCNC: 102 MMOL/L (ref 98–110)
CHOLEST SERPL-MCNC: 170 MG/DL
CHOLEST/HDLC SERPL: 2.5 (CALC)
CO2 SERPL-SCNC: 30 MMOL/L (ref 20–32)
CREAT SERPL-MCNC: 0.9 MG/DL (ref 0.6–0.95)
CREAT UR-MCNC: 66 MG/DL (ref 20–275)
EGFRCR SERPLBLD CKD-EPI 2021: 61 ML/MIN/1.73M2
EOSINOPHIL # BLD AUTO: 235 CELLS/UL (ref 15–500)
EOSINOPHIL NFR BLD AUTO: 2.9 %
ERYTHROCYTE [DISTWIDTH] IN BLOOD BY AUTOMATED COUNT: 13.5 % (ref 11–15)
EST. AVERAGE GLUCOSE BLD GHB EST-MCNC: 148 MG/DL
EST. AVERAGE GLUCOSE BLD GHB EST-SCNC: 8.2 MMOL/L
GLUCOSE SERPL-MCNC: 114 MG/DL (ref 65–99)
HBA1C MFR BLD: 6.8 %
HCT VFR BLD AUTO: 41.3 % (ref 35–45)
HDLC SERPL-MCNC: 68 MG/DL
HGB BLD-MCNC: 13.1 G/DL (ref 11.7–15.5)
LDLC SERPL CALC-MCNC: 82 MG/DL (CALC)
LYMPHOCYTES # BLD AUTO: 2892 CELLS/UL (ref 850–3900)
LYMPHOCYTES NFR BLD AUTO: 35.7 %
MCH RBC QN AUTO: 31.3 PG (ref 27–33)
MCHC RBC AUTO-ENTMCNC: 31.7 G/DL (ref 32–36)
MCV RBC AUTO: 98.6 FL (ref 80–100)
MICROALBUMIN UR-MCNC: 0.6 MG/DL
MONOCYTES # BLD AUTO: 964 CELLS/UL (ref 200–950)
MONOCYTES NFR BLD AUTO: 11.9 %
NEUTROPHILS # BLD AUTO: 3977 CELLS/UL (ref 1500–7800)
NEUTROPHILS NFR BLD AUTO: 49.1 %
NONHDLC SERPL-MCNC: 102 MG/DL (CALC)
PLATELET # BLD AUTO: 180 THOUSAND/UL (ref 140–400)
PMV BLD REES-ECKER: 10.9 FL (ref 7.5–12.5)
POTASSIUM SERPL-SCNC: 4.1 MMOL/L (ref 3.5–5.3)
PROT SERPL-MCNC: 6.7 G/DL (ref 6.1–8.1)
RBC # BLD AUTO: 4.19 MILLION/UL (ref 3.8–5.1)
SODIUM SERPL-SCNC: 142 MMOL/L (ref 135–146)
TRIGL SERPL-MCNC: 104 MG/DL
TSH SERPL-ACNC: 2.34 MIU/L (ref 0.4–4.5)
WBC # BLD AUTO: 8.1 THOUSAND/UL (ref 3.8–10.8)

## 2025-07-01 ENCOUNTER — APPOINTMENT (OUTPATIENT)
Age: OVER 89
End: 2025-07-01
Payer: COMMERCIAL

## 2025-07-02 ENCOUNTER — APPOINTMENT (OUTPATIENT)
Age: OVER 89
End: 2025-07-02
Payer: COMMERCIAL

## 2025-07-02 VITALS
OXYGEN SATURATION: 97 % | HEART RATE: 71 BPM | HEIGHT: 63 IN | SYSTOLIC BLOOD PRESSURE: 144 MMHG | WEIGHT: 149.2 LBS | BODY MASS INDEX: 26.44 KG/M2 | DIASTOLIC BLOOD PRESSURE: 64 MMHG

## 2025-07-02 DIAGNOSIS — N18.31 TYPE 2 DIABETES MELLITUS WITH STAGE 3A CHRONIC KIDNEY DISEASE, WITHOUT LONG-TERM CURRENT USE OF INSULIN (MULTI): ICD-10-CM

## 2025-07-02 DIAGNOSIS — I10 BENIGN ESSENTIAL HTN: ICD-10-CM

## 2025-07-02 DIAGNOSIS — M80.80XS LOCALIZED OSTEOPOROSIS WITH CURRENT PATHOLOGICAL FRACTURE, SEQUELA: ICD-10-CM

## 2025-07-02 DIAGNOSIS — E78.2 MIXED HYPERLIPIDEMIA: ICD-10-CM

## 2025-07-02 DIAGNOSIS — E03.9 ACQUIRED HYPOTHYROIDISM: ICD-10-CM

## 2025-07-02 DIAGNOSIS — E11.22 TYPE 2 DIABETES MELLITUS WITH STAGE 3A CHRONIC KIDNEY DISEASE, WITHOUT LONG-TERM CURRENT USE OF INSULIN (MULTI): ICD-10-CM

## 2025-07-02 DIAGNOSIS — N18.32 CHRONIC KIDNEY DISEASE, STAGE 3B (MULTI): ICD-10-CM

## 2025-07-02 DIAGNOSIS — K21.9 GASTROESOPHAGEAL REFLUX DISEASE WITHOUT ESOPHAGITIS: ICD-10-CM

## 2025-07-02 DIAGNOSIS — E11.9 CONTROLLED TYPE 2 DIABETES MELLITUS WITHOUT COMPLICATION, WITHOUT LONG-TERM CURRENT USE OF INSULIN: Primary | ICD-10-CM

## 2025-07-02 PROCEDURE — 1160F RVW MEDS BY RX/DR IN RCRD: CPT | Performed by: FAMILY MEDICINE

## 2025-07-02 PROCEDURE — G2211 COMPLEX E/M VISIT ADD ON: HCPCS | Performed by: FAMILY MEDICINE

## 2025-07-02 PROCEDURE — 3078F DIAST BP <80 MM HG: CPT | Performed by: FAMILY MEDICINE

## 2025-07-02 PROCEDURE — 1159F MED LIST DOCD IN RCRD: CPT | Performed by: FAMILY MEDICINE

## 2025-07-02 PROCEDURE — 1036F TOBACCO NON-USER: CPT | Performed by: FAMILY MEDICINE

## 2025-07-02 PROCEDURE — 3077F SYST BP >= 140 MM HG: CPT | Performed by: FAMILY MEDICINE

## 2025-07-02 PROCEDURE — 99213 OFFICE O/P EST LOW 20 MIN: CPT | Performed by: FAMILY MEDICINE

## 2025-07-02 RX ORDER — ROSUVASTATIN CALCIUM 20 MG/1
20 TABLET, COATED ORAL DAILY
Qty: 90 TABLET | Refills: 3 | Status: SHIPPED | OUTPATIENT
Start: 2025-07-02

## 2025-07-02 ASSESSMENT — ENCOUNTER SYMPTOMS
FATIGUE: 0
COUGH: 0
ARTHRALGIAS: 0
TROUBLE SWALLOWING: 0
BACK PAIN: 1
SLEEP DISTURBANCE: 0
DYSPHORIC MOOD: 0
HEADACHES: 0
LIGHT-HEADEDNESS: 0
NUMBNESS: 0
ABDOMINAL DISTENTION: 0
DIARRHEA: 0
APPETITE CHANGE: 0
NAUSEA: 0
PALPITATIONS: 0
RHINORRHEA: 0
ADENOPATHY: 0
VOMITING: 0
UNEXPECTED WEIGHT CHANGE: 0
SHORTNESS OF BREATH: 0
CONSTIPATION: 0
DIFFICULTY URINATING: 0
DIZZINESS: 0
ACTIVITY CHANGE: 0
ABDOMINAL PAIN: 0
NERVOUS/ANXIOUS: 0
WHEEZING: 0

## 2025-07-02 ASSESSMENT — PATIENT HEALTH QUESTIONNAIRE - PHQ9
SUM OF ALL RESPONSES TO PHQ9 QUESTIONS 1 AND 2: 0
2. FEELING DOWN, DEPRESSED OR HOPELESS: NOT AT ALL
1. LITTLE INTEREST OR PLEASURE IN DOING THINGS: NOT AT ALL

## 2025-07-02 NOTE — ASSESSMENT & PLAN NOTE
Blood pressure is at goal, electrolyte and renal function stable, no change.  SmartLink not supported outside of the Encounter Diagnoses SmartSection.

## 2025-07-02 NOTE — PROGRESS NOTES
"Subjective   Patient ID: Savannah Yao is a 89 y.o. female who presents for 6MO CK (Labs).    HPI   No headache, chest pain, shortness of breath, dizziness, lightheadedness, or edema  Taking PPI daily without breakthrough symptoms.  Reviewed dietary, caffeine, tobacco, alcohol, and NSAID use.  No dyspepsia, dysphagia, reflux, melena, or abdominal pain.  The patient is taking thyroid medications as directed without problems, labs done in the last year, no symptoms of excessive fatigue, edema or weight gain.  Using a walker, no falls  Seen orthopedics, had injection in shoulder and hand, feels better  Sees pain medicine for LBP  Stable hoarseness, no coughing or dysphagia      Review of Systems   Constitutional:  Negative for activity change, appetite change, fatigue and unexpected weight change.   HENT:  Negative for ear pain, nosebleeds, rhinorrhea, sneezing and trouble swallowing.    Respiratory:  Negative for cough, shortness of breath and wheezing.    Cardiovascular:  Negative for chest pain, palpitations and leg swelling.   Gastrointestinal:  Negative for abdominal distention, abdominal pain, constipation, diarrhea, nausea and vomiting.   Genitourinary:  Negative for difficulty urinating.   Musculoskeletal:  Positive for back pain and gait problem. Negative for arthralgias.   Skin:  Negative for rash.   Neurological:  Negative for dizziness, light-headedness, numbness and headaches.   Hematological:  Negative for adenopathy.   Psychiatric/Behavioral:  Negative for behavioral problems, dysphoric mood and sleep disturbance. The patient is not nervous/anxious.    All other systems reviewed and are negative.      Objective   /64   Pulse 71   Ht 1.6 m (5' 3\")   Wt 67.7 kg (149 lb 3.2 oz)   SpO2 97%   BMI 26.43 kg/m²     Physical Exam  Vitals and nursing note reviewed.   Constitutional:       Appearance: Normal appearance.   HENT:      Head: Normocephalic and atraumatic.      Right Ear: Tympanic " membrane, ear canal and external ear normal.      Left Ear: Tympanic membrane, ear canal and external ear normal.      Nose: Nose normal.      Mouth/Throat:      Mouth: Mucous membranes are moist.      Pharynx: Oropharynx is clear.   Cardiovascular:      Rate and Rhythm: Normal rate and regular rhythm.      Pulses: Normal pulses.      Heart sounds: Normal heart sounds.   Pulmonary:      Effort: Pulmonary effort is normal.      Breath sounds: Normal breath sounds.   Musculoskeletal:      Cervical back: Normal range of motion and neck supple.   Skin:     General: Skin is warm and dry.      Capillary Refill: Capillary refill takes less than 2 seconds.   Neurological:      Mental Status: She is alert.   Psychiatric:         Mood and Affect: Mood normal.         Behavior: Behavior normal.         Assessment/Plan   Problem List Items Addressed This Visit           ICD-10-CM    Benign essential HTN I10    Blood pressure is at goal, electrolyte and renal function stable, no change.  SmartLink not supported outside of the Encounter Diagnoses SmartSection.           Relevant Orders    Follow Up In Primary Care - Established    Comprehensive Metabolic Panel    Type 2 diabetes mellitus with stage 3a chronic kidney disease, without long-term current use of insulin (Multi) - Primary E11.22, N18.31    A1c testing at 6.8, advised about diet and exercise, no change with medication.         Relevant Orders    Follow Up In Primary Care - Established    Cholesterol, LDL Direct    Comprehensive Metabolic Panel    Hemoglobin A1C    GERD (gastroesophageal reflux disease) K21.9    Continue with PPI         Relevant Orders    Follow Up In Primary Care - Established    Hyperlipemia E78.5    Liver function testing normal, LDL cholesterol supple, tolerating rosuvastatin.  SmartLink not supported outside of the Encounter Diagnoses SmartSection.           Relevant Medications    rosuvastatin (Crestor) 20 mg tablet    Other Relevant Orders     Follow Up In Primary Care - Established    Cholesterol, LDL Direct    Comprehensive Metabolic Panel    Hypothyroid E03.9    Continue with current dosing of levothyroxine         Relevant Orders    Follow Up In Primary Care - Established    Osteoporosis M81.0    Continue with calcium and vitamin D         Chronic kidney disease, stage 3b (Multi) N18.32    Blood pressure stable, renal function stable, no change.         Relevant Orders    Follow Up In Primary Care - Established    Comprehensive Metabolic Panel    CBC and Auto Differential

## 2025-07-02 NOTE — ASSESSMENT & PLAN NOTE
Liver function testing normal, LDL cholesterol supple, tolerating rosuvastatin.  SmartLink not supported outside of the Encounter Diagnoses SmartSection.

## 2025-09-02 ENCOUNTER — CLINICAL SUPPORT (OUTPATIENT)
Age: OVER 89
End: 2025-09-02
Payer: COMMERCIAL

## 2025-09-02 PROCEDURE — G0008 ADMIN INFLUENZA VIRUS VAC: HCPCS | Performed by: FAMILY MEDICINE

## 2025-09-02 PROCEDURE — 90662 IIV NO PRSV INCREASED AG IM: CPT | Performed by: FAMILY MEDICINE

## 2026-01-08 ENCOUNTER — APPOINTMENT (OUTPATIENT)
Age: OVER 89
End: 2026-01-08
Payer: COMMERCIAL

## (undated) DEVICE — BLADE, FINETEETH 4.1 X 25.3 X .4

## (undated) DEVICE — SYRINGE, 60 CC, IRRIGATION, BULB, CONTRO-BULB, PAPER POUCH

## (undated) DEVICE — BANDAGE, GAUZE, CONFORMING, KERLIX LITE, 4 IN X 4.1 YD, STERILE

## (undated) DEVICE — CLEANER, WIPE, INSTRUMENT, 3.25 X 3.25 IN

## (undated) DEVICE — DRESSING, GAUZE, 16 PLY, 4 X 4 IN, STERILE

## (undated) DEVICE — SUTURE, VICRYL, 3-0,18 IN, SH, UNDYED

## (undated) DEVICE — SHIELD, EYE, FOX, CONVEX, ALUMINUM, NS

## (undated) DEVICE — PAD, EYE, OVAL, 1 5/8 X 2 5/8 IN, STERILE

## (undated) DEVICE — CUP, SOLUTION

## (undated) DEVICE — MARKER, SKIN, RULER AND LABEL PACK, CUSTOM

## (undated) DEVICE — SUTURE, SILK, 3-0, 18 IN SH/CR, BLACK

## (undated) DEVICE — COUNTER, NEEDLE, FOAM BLOCK, W/MAGNET, W/BLADE GUARD, 10 COUNT, RED, LF

## (undated) DEVICE — SUTURE, MONOCRYLIC, 5-0, 18 IN, P-3

## (undated) DEVICE — SUTURE, SURGIPRO, 5-0, 18 IN, P13, BLUE

## (undated) DEVICE — MANIFOLD, 4 PORT NEPTUNE STANDARD

## (undated) DEVICE — COVER, CART, 45 X 27 X 48 IN, CLEAR

## (undated) DEVICE — Device

## (undated) DEVICE — SUTURE, PERMA HAND 2-0, TAPER POINT, SH BLACK 8-18 INCH

## (undated) DEVICE — SPONGE, DISSECTOR, PEANUT, 3/8, STERILE 5 FOAM HOLDER"

## (undated) DEVICE — DENTITION PROTECTOR, QUICKGUARD, NON-PERF

## (undated) DEVICE — CATHETER, IV, ANGIOCATH, 14 G X 1.88 IN, FEP POLYMER

## (undated) DEVICE — BOWL, BASIN, 32 OZ, STERILE

## (undated) DEVICE — SUTURE, SILK, 2-0, 18 IN, BLACK

## (undated) DEVICE — REST, HEAD, BAGEL, 9 IN

## (undated) DEVICE — PITCHER, GRADUATE, 32 OZ (1200CC), STERILE

## (undated) DEVICE — ANTIFOG, SOLUTION, FOG-OUT

## (undated) DEVICE — SUTURE, MONOCRYL, 4-0, 18 IN, PS2, UNDYED

## (undated) DEVICE — SUTURE, PROLENE, 4-0 VB/RB-1, 36IN, BLUE

## (undated) DEVICE — DRESSING, ISLAND, TELFA, 4 X 5 IN

## (undated) DEVICE — STRIP, SKIN CLOSURE, STERI STRIP, REINFORCED, 0.5 X 4 IN

## (undated) DEVICE — COVER, MAYO STAND, W/PAD, 23 IN, DISPOSABLE, PLASTIC, LF, STERILE

## (undated) DEVICE — COVER, TABLE, UHC